# Patient Record
Sex: MALE | Race: WHITE | NOT HISPANIC OR LATINO | Employment: OTHER | URBAN - METROPOLITAN AREA
[De-identification: names, ages, dates, MRNs, and addresses within clinical notes are randomized per-mention and may not be internally consistent; named-entity substitution may affect disease eponyms.]

---

## 2021-05-31 ENCOUNTER — RECORDS - HEALTHEAST (OUTPATIENT)
Dept: ADMINISTRATIVE | Facility: CLINIC | Age: 76
End: 2021-05-31

## 2021-06-03 ENCOUNTER — RECORDS - HEALTHEAST (OUTPATIENT)
Dept: ADMINISTRATIVE | Facility: CLINIC | Age: 76
End: 2021-06-03

## 2021-06-25 ENCOUNTER — COMMUNICATION - HEALTHEAST (OUTPATIENT)
Dept: SCHEDULING | Facility: CLINIC | Age: 76
End: 2021-06-25

## 2021-06-27 ENCOUNTER — COMMUNICATION - HEALTHEAST (OUTPATIENT)
Dept: NEUROSURGERY | Facility: CLINIC | Age: 76
End: 2021-06-27

## 2021-06-29 ENCOUNTER — COMMUNICATION - HEALTHEAST (OUTPATIENT)
Dept: NEUROSURGERY | Facility: CLINIC | Age: 76
End: 2021-06-29

## 2021-07-04 NOTE — TELEPHONE ENCOUNTER
Per ABDIRASHID,  wanted patient scheduled first available appointment with her. Patient declined scheduling at this time as he plans to follow up through [facility]. He stated he may callback if the wait is too long with [facility].

## 2021-07-05 PROBLEM — N17.9 AKI (ACUTE KIDNEY INJURY) (H): Status: ACTIVE | Noted: 2021-06-25

## 2021-07-05 PROBLEM — R55 SYNCOPE, UNSPECIFIED SYNCOPE TYPE: Status: ACTIVE | Noted: 2021-06-24

## 2021-07-12 ENCOUNTER — PREP FOR PROCEDURE (OUTPATIENT)
Dept: NEUROSURGERY | Facility: CLINIC | Age: 76
End: 2021-07-12

## 2021-07-12 DIAGNOSIS — M54.12 CERVICAL RADICULOPATHY: Primary | ICD-10-CM

## 2021-07-13 DIAGNOSIS — Z11.59 ENCOUNTER FOR SCREENING FOR OTHER VIRAL DISEASES: ICD-10-CM

## 2021-07-15 ENCOUNTER — TELEPHONE (OUTPATIENT)
Dept: NEUROSURGERY | Facility: CLINIC | Age: 76
End: 2021-07-15

## 2021-07-15 DIAGNOSIS — Z01.818 PRE-OP TESTING: Primary | ICD-10-CM

## 2021-07-15 NOTE — TELEPHONE ENCOUNTER
ORDER FROM: Dr. Layton    PRE AUTHORIZATION: No PA required. Ok to schedule.    METHOD OF PATIENT CONTACT: Spoke with Shant on the phone. Best number to reach: [phone]    PROCEDURE: Removal of prior cervical 5-cervical 6 cervical plate; cervical 6-cervical 7 anterior cervical decompression and fusion    SURGICAL DATE: 7/26/21 @ 9:10 am - [facility]    COVID TEST: 7/22/21 @ 10:45 am at the [facility] lab    READINESS VISIT: 7/22/21 @ 11:30 am     PCP, CLINIC, PHONE#: Dr. Martin Smith, [facility], [phone]    FILM INFO: MRI CERVICAL: 6/24/21 @ [facility]          XRAY CERVICAL: 6/25/21 @ [facility]    SURGICAL LETTER: E-mailed to patient on 7/15/21

## 2021-07-22 ENCOUNTER — LAB (OUTPATIENT)
Dept: LAB | Facility: HOSPITAL | Age: 76
End: 2021-07-22
Payer: COMMERCIAL

## 2021-07-22 ENCOUNTER — ALLIED HEALTH/NURSE VISIT (OUTPATIENT)
Dept: NEUROSURGERY | Facility: CLINIC | Age: 76
End: 2021-07-22
Payer: COMMERCIAL

## 2021-07-22 ENCOUNTER — LAB (OUTPATIENT)
Dept: FAMILY MEDICINE | Facility: CLINIC | Age: 76
End: 2021-07-22
Attending: SURGERY
Payer: COMMERCIAL

## 2021-07-22 VITALS
SYSTOLIC BLOOD PRESSURE: 164 MMHG | DIASTOLIC BLOOD PRESSURE: 78 MMHG | RESPIRATION RATE: 18 BRPM | HEART RATE: 70 BPM | OXYGEN SATURATION: 95 %

## 2021-07-22 DIAGNOSIS — M54.12 CERVICAL RADICULOPATHY: Primary | ICD-10-CM

## 2021-07-22 DIAGNOSIS — Z01.818 PRE-OP TESTING: ICD-10-CM

## 2021-07-22 DIAGNOSIS — Z11.59 ENCOUNTER FOR SCREENING FOR OTHER VIRAL DISEASES: ICD-10-CM

## 2021-07-22 LAB
ANION GAP SERPL CALCULATED.3IONS-SCNC: 9 MMOL/L (ref 5–18)
APTT PPP: 30 SECONDS (ref 22–38)
BUN SERPL-MCNC: 23 MG/DL (ref 8–28)
CALCIUM SERPL-MCNC: 9.6 MG/DL (ref 8.5–10.5)
CHLORIDE BLD-SCNC: 109 MMOL/L (ref 98–107)
CLOSURE TME COLL+EPINEP BLD: 105 SECONDS
CO2 SERPL-SCNC: 26 MMOL/L (ref 22–31)
CREAT SERPL-MCNC: 1.49 MG/DL (ref 0.7–1.3)
ERYTHROCYTE [DISTWIDTH] IN BLOOD BY AUTOMATED COUNT: 14.7 % (ref 10–15)
GFR SERPL CREATININE-BSD FRML MDRD: 45 ML/MIN/1.73M2
GLUCOSE BLD-MCNC: 84 MG/DL (ref 70–125)
HCT VFR BLD AUTO: 41.8 % (ref 40–53)
HGB BLD-MCNC: 13.3 G/DL (ref 13.3–17.7)
INR PPP: 1.04 (ref 0.9–1.15)
MCH RBC QN AUTO: 26.8 PG (ref 26.5–33)
MCHC RBC AUTO-ENTMCNC: 31.8 G/DL (ref 31.5–36.5)
MCV RBC AUTO: 84 FL (ref 78–100)
PLATELET # BLD AUTO: 394 10E3/UL (ref 150–450)
POTASSIUM BLD-SCNC: 3.8 MMOL/L (ref 3.5–5)
RBC # BLD AUTO: 4.96 10E6/UL (ref 4.4–5.9)
SARS-COV-2 RNA RESP QL NAA+PROBE: NEGATIVE
SODIUM SERPL-SCNC: 144 MMOL/L (ref 136–145)
WBC # BLD AUTO: 7.7 10E3/UL (ref 4–11)

## 2021-07-22 PROCEDURE — U0005 INFEC AGEN DETEC AMPLI PROBE: HCPCS

## 2021-07-22 PROCEDURE — 85610 PROTHROMBIN TIME: CPT | Mod: GZ

## 2021-07-22 PROCEDURE — 82374 ASSAY BLOOD CARBON DIOXIDE: CPT

## 2021-07-22 PROCEDURE — 85730 THROMBOPLASTIN TIME PARTIAL: CPT

## 2021-07-22 PROCEDURE — U0003 INFECTIOUS AGENT DETECTION BY NUCLEIC ACID (DNA OR RNA); SEVERE ACUTE RESPIRATORY SYNDROME CORONAVIRUS 2 (SARS-COV-2) (CORONAVIRUS DISEASE [COVID-19]), AMPLIFIED PROBE TECHNIQUE, MAKING USE OF HIGH THROUGHPUT TECHNOLOGIES AS DESCRIBED BY CMS-2020-01-R: HCPCS

## 2021-07-22 PROCEDURE — 85027 COMPLETE CBC AUTOMATED: CPT

## 2021-07-22 PROCEDURE — 36415 COLL VENOUS BLD VENIPUNCTURE: CPT

## 2021-07-22 PROCEDURE — 99207 PR NO CHARGE NURSE ONLY: CPT

## 2021-07-22 PROCEDURE — 85576 BLOOD PLATELET AGGREGATION: CPT | Mod: TC

## 2021-07-22 NOTE — PROGRESS NOTES
Preop Assessment: Shant Muñoz presents for pre-op review.  Surgeon: Calin  Name of Surgery: Removal of C5-6 hardware; C6-7 ACDF   Diagnosis: cervical radiculopathy   Date of Surgery: 7/26/21  Time of Surgery: 0910  Hospital: [facility]   H&P: Completed 7/8/21 at [facility] - cleared for surgery   History of ASA, NSAIDS, vitamin and/or herbal supplements within 10 days: No  History of blood thinners: Yes  History of anti-seizure med's: No  Review of systems: WNL    Diagnostics:  Labs: WNL, covid pending   CXR: n/a   EKG: WNL  Other: n/a   Films: MRI cervical 6/24/21    Nausea or Vomiting - denies  Urinary retention - denies  Pain management - no Red Flags  Home PT Evaluation: ambulates independently, steady gait and stride, no imbalance noted  - no indication for Home PT pre-op    Patient does NOT have help/assistance in place at home upon discharge; will likely need TCU placement and/or home care services.     All questions answered regarding surgery and expected pre and postoperative course including rehabilitation phase.     Reviewed with patient: Arrive 2.5 -3 hours prior to scheduled surgery, nothing to eat or drink after midnight the night before surgery and bring all pertinent films to the hospital the day of surgery.  Continue to refrain from NSAIDS (Ibuprofen, Aleve, Naprosyn) ASA or over the counter herbal medication or supplements, anticoagulants and blood thinners.    Preop skin preparations and instructions provided.    Patient was informed that we will provide up to 1 week prescription of pain medication for post-operative pain.    Discussed with patient the following: after your surgery, if you will be staying in-patient, a nursing team will be monitoring you closely throughout your stay and communicate your health status to your surgeon and other providers.  You will be seen by Advanced Practice Providers (e.g., nurse practitioners, clinic nurse specialist, and physician assistants) who will check  on you regularly to assess the status of your surgery.   All of pt's questions and concerns were addressed to his satisfaction. He was informed that we will call him after discharge to schedule his post-op follow up appointments.     Holly Barnes RN

## 2021-07-26 ENCOUNTER — APPOINTMENT (OUTPATIENT)
Dept: RADIOLOGY | Facility: HOSPITAL | Age: 76
End: 2021-07-26
Attending: SURGERY
Payer: COMMERCIAL

## 2021-07-26 ENCOUNTER — HOSPITAL ENCOUNTER (OUTPATIENT)
Facility: HOSPITAL | Age: 76
LOS: 1 days | Discharge: HOME | End: 2021-07-27
Attending: SURGERY | Admitting: SURGERY
Payer: COMMERCIAL

## 2021-07-26 ENCOUNTER — ANESTHESIA (OUTPATIENT)
Dept: SURGERY | Facility: HOSPITAL | Age: 76
End: 2021-07-26
Payer: COMMERCIAL

## 2021-07-26 ENCOUNTER — ANESTHESIA EVENT (OUTPATIENT)
Dept: SURGERY | Facility: HOSPITAL | Age: 76
End: 2021-07-26
Payer: COMMERCIAL

## 2021-07-26 DIAGNOSIS — M54.12 CERVICAL RADICULOPATHY: ICD-10-CM

## 2021-07-26 DIAGNOSIS — M54.12 BRACHIAL NEURITIS OR RADICULITIS: Primary | ICD-10-CM

## 2021-07-26 LAB
GLUCOSE BLDC GLUCOMTR-MCNC: 123 MG/DL (ref 70–125)
GLUCOSE BLDC GLUCOMTR-MCNC: 150 MG/DL (ref 70–125)

## 2021-07-26 PROCEDURE — 22853 INSJ BIOMECHANICAL DEVICE: CPT | Mod: AS | Performed by: NURSE PRACTITIONER

## 2021-07-26 PROCEDURE — 22551 ARTHRD ANT NTRBDY CERVICAL: CPT | Performed by: SURGERY

## 2021-07-26 PROCEDURE — 710N000009 HC RECOVERY PHASE 1, LEVEL 1, PER MIN: Performed by: SURGERY

## 2021-07-26 PROCEDURE — 22855 REMOVAL ANTERIOR INSTRMJ: CPT | Mod: AS | Performed by: NURSE PRACTITIONER

## 2021-07-26 PROCEDURE — 278N000051 HC OR IMPLANT GENERAL: Performed by: SURGERY

## 2021-07-26 PROCEDURE — 999N000141 HC STATISTIC PRE-PROCEDURE NURSING ASSESSMENT: Performed by: SURGERY

## 2021-07-26 PROCEDURE — 250N000009 HC RX 250

## 2021-07-26 PROCEDURE — 258N000003 HC RX IP 258 OP 636: Performed by: NURSE PRACTITIONER

## 2021-07-26 PROCEDURE — C1762 CONN TISS, HUMAN(INC FASCIA): HCPCS | Performed by: SURGERY

## 2021-07-26 PROCEDURE — 250N000013 HC RX MED GY IP 250 OP 250 PS 637: Performed by: NURSE PRACTITIONER

## 2021-07-26 PROCEDURE — 250N000009 HC RX 250: Performed by: NURSE ANESTHETIST, CERTIFIED REGISTERED

## 2021-07-26 PROCEDURE — 360N000077 HC SURGERY LEVEL 4, PER MIN: Performed by: SURGERY

## 2021-07-26 PROCEDURE — C1713 ANCHOR/SCREW BN/BN,TIS/BN: HCPCS | Performed by: SURGERY

## 2021-07-26 PROCEDURE — 22855 REMOVAL ANTERIOR INSTRMJ: CPT | Performed by: SURGERY

## 2021-07-26 PROCEDURE — 258N000003 HC RX IP 258 OP 636: Performed by: NURSE ANESTHETIST, CERTIFIED REGISTERED

## 2021-07-26 PROCEDURE — 22845 INSERT SPINE FIXATION DEVICE: CPT | Mod: AS | Performed by: NURSE PRACTITIONER

## 2021-07-26 PROCEDURE — 22551 ARTHRD ANT NTRBDY CERVICAL: CPT | Mod: AS | Performed by: NURSE PRACTITIONER

## 2021-07-26 PROCEDURE — 250N000011 HC RX IP 250 OP 636: Performed by: ANESTHESIOLOGY

## 2021-07-26 PROCEDURE — 370N000017 HC ANESTHESIA TECHNICAL FEE, PER MIN: Performed by: SURGERY

## 2021-07-26 PROCEDURE — 272N000683 HC OR SPINE - CAGE/SPACER/DISK/CORD/CONNECTOR OPNP: Performed by: SURGERY

## 2021-07-26 PROCEDURE — 120N000001 HC R&B MED SURG/OB

## 2021-07-26 PROCEDURE — 272N000001 HC OR GENERAL SUPPLY STERILE: Performed by: SURGERY

## 2021-07-26 PROCEDURE — 22845 INSERT SPINE FIXATION DEVICE: CPT | Mod: 59 | Performed by: SURGERY

## 2021-07-26 PROCEDURE — 250N000009 HC RX 250: Performed by: SURGERY

## 2021-07-26 PROCEDURE — 250N000011 HC RX IP 250 OP 636

## 2021-07-26 PROCEDURE — 999N000182 XR SURGERY CARM FLUORO GREATER THAN 5 MIN

## 2021-07-26 PROCEDURE — 250N000025 HC SEVOFLURANE, PER MIN: Performed by: SURGERY

## 2021-07-26 PROCEDURE — 72040 X-RAY EXAM NECK SPINE 2-3 VW: CPT

## 2021-07-26 PROCEDURE — 250N000011 HC RX IP 250 OP 636: Performed by: NURSE ANESTHETIST, CERTIFIED REGISTERED

## 2021-07-26 PROCEDURE — 22853 INSJ BIOMECHANICAL DEVICE: CPT | Performed by: SURGERY

## 2021-07-26 DEVICE — IMP PLATE CERV MEDT ZEVO 23MM 1 LVL 3001023: Type: IMPLANTABLE DEVICE | Site: SPINE CERVICAL | Status: FUNCTIONAL

## 2021-07-26 DEVICE — GRAFT BONE FIBERS GRAFTON DBM DBF 1ML T50101: Type: IMPLANTABLE DEVICE | Site: SPINE CERVICAL | Status: FUNCTIONAL

## 2021-07-26 DEVICE — IMP SCR MEDT ZEVO 4.0X15MM SD VA 7714015: Type: IMPLANTABLE DEVICE | Site: SPINE CERVICAL | Status: FUNCTIONAL

## 2021-07-26 DEVICE — INTERBODY FUSION DEVICE  6 DEGREE MEDIUM 7MM
Type: IMPLANTABLE DEVICE | Site: SPINE CERVICAL | Status: FUNCTIONAL
Brand: ENDOSKELETON® TC NANOLOCK® SURFACE TECHNOLOGY

## 2021-07-26 DEVICE — IMP SCR MEDT ZEVO 3.5X15MM SD VA 7713515: Type: IMPLANTABLE DEVICE | Site: SPINE CERVICAL | Status: FUNCTIONAL

## 2021-07-26 RX ORDER — FENTANYL CITRATE 50 UG/ML
INJECTION, SOLUTION INTRAMUSCULAR; INTRAVENOUS PRN
Status: DISCONTINUED | OUTPATIENT
Start: 2021-07-26 | End: 2021-07-26

## 2021-07-26 RX ORDER — CEFAZOLIN SODIUM 2 G/100ML
INJECTION, SOLUTION INTRAVENOUS PRN
Status: DISCONTINUED | OUTPATIENT
Start: 2021-07-26 | End: 2021-07-26

## 2021-07-26 RX ORDER — LORATADINE 10 MG/1
10 TABLET ORAL DAILY PRN
Status: DISCONTINUED | OUTPATIENT
Start: 2021-07-26 | End: 2021-07-27 | Stop reason: HOSPADM

## 2021-07-26 RX ORDER — LANOLIN ALCOHOL/MO/W.PET/CERES
1 CREAM (GRAM) TOPICAL
COMMUNITY
End: 2021-11-09

## 2021-07-26 RX ORDER — ACETAMINOPHEN 325 MG/1
650 TABLET ORAL EVERY 4 HOURS PRN
Status: DISCONTINUED | OUTPATIENT
Start: 2021-07-29 | End: 2021-07-27 | Stop reason: HOSPADM

## 2021-07-26 RX ORDER — NALOXONE HYDROCHLORIDE 0.4 MG/ML
0.2 INJECTION, SOLUTION INTRAMUSCULAR; INTRAVENOUS; SUBCUTANEOUS
Status: DISCONTINUED | OUTPATIENT
Start: 2021-07-26 | End: 2021-07-27 | Stop reason: HOSPADM

## 2021-07-26 RX ORDER — PROPOFOL 10 MG/ML
INJECTION, EMULSION INTRAVENOUS CONTINUOUS PRN
Status: DISCONTINUED | OUTPATIENT
Start: 2021-07-26 | End: 2021-07-26

## 2021-07-26 RX ORDER — SODIUM CHLORIDE, SODIUM LACTATE, POTASSIUM CHLORIDE, CALCIUM CHLORIDE 600; 310; 30; 20 MG/100ML; MG/100ML; MG/100ML; MG/100ML
INJECTION, SOLUTION INTRAVENOUS CONTINUOUS
Status: DISCONTINUED | OUTPATIENT
Start: 2021-07-26 | End: 2021-07-26 | Stop reason: HOSPADM

## 2021-07-26 RX ORDER — DIPHENHYDRAMINE HCL 12.5 MG/5ML
12.5 SOLUTION ORAL EVERY 6 HOURS PRN
Status: DISCONTINUED | OUTPATIENT
Start: 2021-07-26 | End: 2021-07-26 | Stop reason: HOSPADM

## 2021-07-26 RX ORDER — FENTANYL CITRATE 50 UG/ML
50 INJECTION, SOLUTION INTRAMUSCULAR; INTRAVENOUS EVERY 5 MIN PRN
Status: DISCONTINUED | OUTPATIENT
Start: 2021-07-26 | End: 2021-07-26 | Stop reason: HOSPADM

## 2021-07-26 RX ORDER — HYDROMORPHONE HCL IN WATER/PF 6 MG/30 ML
0.4 PATIENT CONTROLLED ANALGESIA SYRINGE INTRAVENOUS EVERY 5 MIN PRN
Status: DISCONTINUED | OUTPATIENT
Start: 2021-07-26 | End: 2021-07-26 | Stop reason: HOSPADM

## 2021-07-26 RX ORDER — ONDANSETRON 2 MG/ML
4 INJECTION INTRAMUSCULAR; INTRAVENOUS EVERY 6 HOURS PRN
Status: DISCONTINUED | OUTPATIENT
Start: 2021-07-26 | End: 2021-07-27 | Stop reason: HOSPADM

## 2021-07-26 RX ORDER — ALBUTEROL SULFATE 0.83 MG/ML
2.5 SOLUTION RESPIRATORY (INHALATION) EVERY 4 HOURS PRN
Status: DISCONTINUED | OUTPATIENT
Start: 2021-07-26 | End: 2021-07-26 | Stop reason: HOSPADM

## 2021-07-26 RX ORDER — LIDOCAINE HYDROCHLORIDE 20 MG/ML
INJECTION, SOLUTION INFILTRATION; PERINEURAL PRN
Status: DISCONTINUED | OUTPATIENT
Start: 2021-07-26 | End: 2021-07-26

## 2021-07-26 RX ORDER — BISACODYL 10 MG
10 SUPPOSITORY, RECTAL RECTAL DAILY PRN
Status: DISCONTINUED | OUTPATIENT
Start: 2021-07-26 | End: 2021-07-27 | Stop reason: HOSPADM

## 2021-07-26 RX ORDER — HYDROMORPHONE HCL IN WATER/PF 6 MG/30 ML
0.4 PATIENT CONTROLLED ANALGESIA SYRINGE INTRAVENOUS
Status: DISCONTINUED | OUTPATIENT
Start: 2021-07-26 | End: 2021-07-27 | Stop reason: HOSPADM

## 2021-07-26 RX ORDER — ONDANSETRON 4 MG/1
4 TABLET, ORALLY DISINTEGRATING ORAL EVERY 30 MIN PRN
Status: DISCONTINUED | OUTPATIENT
Start: 2021-07-26 | End: 2021-07-26 | Stop reason: HOSPADM

## 2021-07-26 RX ORDER — SODIUM CHLORIDE 9 MG/ML
INJECTION, SOLUTION INTRAVENOUS CONTINUOUS
Status: DISCONTINUED | OUTPATIENT
Start: 2021-07-26 | End: 2021-07-27

## 2021-07-26 RX ORDER — DEXAMETHASONE SODIUM PHOSPHATE 10 MG/ML
INJECTION, SOLUTION INTRAMUSCULAR; INTRAVENOUS PRN
Status: DISCONTINUED | OUTPATIENT
Start: 2021-07-26 | End: 2021-07-26

## 2021-07-26 RX ORDER — NALOXONE HYDROCHLORIDE 0.4 MG/ML
0.4 INJECTION, SOLUTION INTRAMUSCULAR; INTRAVENOUS; SUBCUTANEOUS
Status: DISCONTINUED | OUTPATIENT
Start: 2021-07-26 | End: 2021-07-27 | Stop reason: HOSPADM

## 2021-07-26 RX ORDER — LABETALOL 20 MG/4 ML (5 MG/ML) INTRAVENOUS SYRINGE
PRN
Status: DISCONTINUED | OUTPATIENT
Start: 2021-07-26 | End: 2021-07-26

## 2021-07-26 RX ORDER — ONDANSETRON 2 MG/ML
INJECTION INTRAMUSCULAR; INTRAVENOUS PRN
Status: DISCONTINUED | OUTPATIENT
Start: 2021-07-26 | End: 2021-07-26

## 2021-07-26 RX ORDER — MEPERIDINE HYDROCHLORIDE 25 MG/ML
12.5 INJECTION INTRAMUSCULAR; INTRAVENOUS; SUBCUTANEOUS EVERY 5 MIN PRN
Status: DISCONTINUED | OUTPATIENT
Start: 2021-07-26 | End: 2021-07-26 | Stop reason: HOSPADM

## 2021-07-26 RX ORDER — ONDANSETRON 2 MG/ML
4 INJECTION INTRAMUSCULAR; INTRAVENOUS EVERY 30 MIN PRN
Status: DISCONTINUED | OUTPATIENT
Start: 2021-07-26 | End: 2021-07-26 | Stop reason: HOSPADM

## 2021-07-26 RX ORDER — LORAZEPAM 2 MG/ML
0.5 INJECTION INTRAMUSCULAR ONCE
Status: COMPLETED | OUTPATIENT
Start: 2021-07-26 | End: 2021-07-27

## 2021-07-26 RX ORDER — AMOXICILLIN 250 MG
1 CAPSULE ORAL 2 TIMES DAILY
Status: DISCONTINUED | OUTPATIENT
Start: 2021-07-26 | End: 2021-07-27 | Stop reason: HOSPADM

## 2021-07-26 RX ORDER — METHOCARBAMOL 500 MG/1
500 TABLET, FILM COATED ORAL EVERY 6 HOURS PRN
Status: DISCONTINUED | OUTPATIENT
Start: 2021-07-26 | End: 2021-07-27

## 2021-07-26 RX ORDER — FAMOTIDINE 20 MG/1
20 TABLET, FILM COATED ORAL 2 TIMES DAILY
Status: DISCONTINUED | OUTPATIENT
Start: 2021-07-26 | End: 2021-07-27 | Stop reason: HOSPADM

## 2021-07-26 RX ORDER — OXYCODONE HYDROCHLORIDE 5 MG/1
5 TABLET ORAL EVERY 4 HOURS PRN
Status: DISCONTINUED | OUTPATIENT
Start: 2021-07-26 | End: 2021-07-27 | Stop reason: HOSPADM

## 2021-07-26 RX ORDER — DIPHENHYDRAMINE HYDROCHLORIDE 50 MG/ML
12.5 INJECTION INTRAMUSCULAR; INTRAVENOUS EVERY 6 HOURS PRN
Status: DISCONTINUED | OUTPATIENT
Start: 2021-07-26 | End: 2021-07-26 | Stop reason: HOSPADM

## 2021-07-26 RX ORDER — PROCHLORPERAZINE MALEATE 5 MG
5 TABLET ORAL EVERY 6 HOURS PRN
Status: DISCONTINUED | OUTPATIENT
Start: 2021-07-26 | End: 2021-07-27 | Stop reason: HOSPADM

## 2021-07-26 RX ORDER — LABETALOL HYDROCHLORIDE 5 MG/ML
10 INJECTION, SOLUTION INTRAVENOUS
Status: DISCONTINUED | OUTPATIENT
Start: 2021-07-26 | End: 2021-07-26 | Stop reason: HOSPADM

## 2021-07-26 RX ORDER — LIDOCAINE 40 MG/G
CREAM TOPICAL
Status: DISCONTINUED | OUTPATIENT
Start: 2021-07-26 | End: 2021-07-26 | Stop reason: HOSPADM

## 2021-07-26 RX ORDER — ONDANSETRON 4 MG/1
4 TABLET, ORALLY DISINTEGRATING ORAL EVERY 6 HOURS PRN
Status: DISCONTINUED | OUTPATIENT
Start: 2021-07-26 | End: 2021-07-27 | Stop reason: HOSPADM

## 2021-07-26 RX ORDER — AMLODIPINE BESYLATE 5 MG/1
5 TABLET ORAL DAILY
Status: DISCONTINUED | OUTPATIENT
Start: 2021-07-26 | End: 2021-07-27 | Stop reason: HOSPADM

## 2021-07-26 RX ORDER — HALOPERIDOL 5 MG/ML
1 INJECTION INTRAMUSCULAR
Status: DISCONTINUED | OUTPATIENT
Start: 2021-07-26 | End: 2021-07-26 | Stop reason: HOSPADM

## 2021-07-26 RX ORDER — HYDROMORPHONE HCL IN WATER/PF 6 MG/30 ML
0.2 PATIENT CONTROLLED ANALGESIA SYRINGE INTRAVENOUS
Status: DISCONTINUED | OUTPATIENT
Start: 2021-07-26 | End: 2021-07-27 | Stop reason: HOSPADM

## 2021-07-26 RX ORDER — OXYCODONE HYDROCHLORIDE 5 MG/1
10 TABLET ORAL EVERY 4 HOURS PRN
Status: DISCONTINUED | OUTPATIENT
Start: 2021-07-26 | End: 2021-07-27 | Stop reason: HOSPADM

## 2021-07-26 RX ORDER — LISINOPRIL 20 MG/1
20 TABLET ORAL DAILY
Status: DISCONTINUED | OUTPATIENT
Start: 2021-07-27 | End: 2021-07-27 | Stop reason: HOSPADM

## 2021-07-26 RX ORDER — PROPOFOL 10 MG/ML
INJECTION, EMULSION INTRAVENOUS PRN
Status: DISCONTINUED | OUTPATIENT
Start: 2021-07-26 | End: 2021-07-26

## 2021-07-26 RX ORDER — POLYETHYLENE GLYCOL 3350 17 G/17G
17 POWDER, FOR SOLUTION ORAL DAILY
Status: DISCONTINUED | OUTPATIENT
Start: 2021-07-27 | End: 2021-07-27 | Stop reason: HOSPADM

## 2021-07-26 RX ORDER — ACETAMINOPHEN 325 MG/1
975 TABLET ORAL EVERY 8 HOURS
Status: DISCONTINUED | OUTPATIENT
Start: 2021-07-26 | End: 2021-07-27 | Stop reason: HOSPADM

## 2021-07-26 RX ORDER — SODIUM CHLORIDE, SODIUM LACTATE, POTASSIUM CHLORIDE, CALCIUM CHLORIDE 600; 310; 30; 20 MG/100ML; MG/100ML; MG/100ML; MG/100ML
INJECTION, SOLUTION INTRAVENOUS CONTINUOUS PRN
Status: DISCONTINUED | OUTPATIENT
Start: 2021-07-26 | End: 2021-07-26

## 2021-07-26 RX ORDER — MULTIVITAMIN,THERAPEUTIC
1 TABLET ORAL DAILY
Status: DISCONTINUED | OUTPATIENT
Start: 2021-07-27 | End: 2021-07-27 | Stop reason: HOSPADM

## 2021-07-26 RX ORDER — PRAVASTATIN SODIUM 20 MG
80 TABLET ORAL AT BEDTIME
Status: DISCONTINUED | OUTPATIENT
Start: 2021-07-26 | End: 2021-07-27 | Stop reason: HOSPADM

## 2021-07-26 RX ADMIN — DEXMEDETOMIDINE 0.4 MCG/KG/HR: 100 INJECTION, SOLUTION, CONCENTRATE INTRAVENOUS at 09:32

## 2021-07-26 RX ADMIN — DOCUSATE SODIUM 50 MG AND SENNOSIDES 8.6 MG 1 TABLET: 8.6; 5 TABLET, FILM COATED ORAL at 20:38

## 2021-07-26 RX ADMIN — FENTANYL CITRATE 50 MCG: 50 INJECTION, SOLUTION INTRAMUSCULAR; INTRAVENOUS at 09:28

## 2021-07-26 RX ADMIN — FENTANYL CITRATE 50 MCG: 50 INJECTION, SOLUTION INTRAMUSCULAR; INTRAVENOUS at 14:27

## 2021-07-26 RX ADMIN — PROPOFOL 100 MG: 10 INJECTION, EMULSION INTRAVENOUS at 09:22

## 2021-07-26 RX ADMIN — METHOCARBAMOL 500 MG: 500 TABLET, FILM COATED ORAL at 16:51

## 2021-07-26 RX ADMIN — SODIUM CHLORIDE: 9 INJECTION, SOLUTION INTRAVENOUS at 16:51

## 2021-07-26 RX ADMIN — ACETAMINOPHEN 975 MG: 325 TABLET ORAL at 20:38

## 2021-07-26 RX ADMIN — PROPOFOL 150 MCG/KG/MIN: 10 INJECTION, EMULSION INTRAVENOUS at 09:15

## 2021-07-26 RX ADMIN — PROPOFOL 100 MG: 10 INJECTION, EMULSION INTRAVENOUS at 09:14

## 2021-07-26 RX ADMIN — LABETALOL 20 MG/4 ML (5 MG/ML) INTRAVENOUS SYRINGE 15 MG: at 12:31

## 2021-07-26 RX ADMIN — FAMOTIDINE 20 MG: 20 TABLET, FILM COATED ORAL at 20:37

## 2021-07-26 RX ADMIN — SODIUM CHLORIDE, POTASSIUM CHLORIDE, SODIUM LACTATE AND CALCIUM CHLORIDE: 600; 310; 30; 20 INJECTION, SOLUTION INTRAVENOUS at 08:56

## 2021-07-26 RX ADMIN — FENTANYL CITRATE 150 MCG: 50 INJECTION, SOLUTION INTRAMUSCULAR; INTRAVENOUS at 09:14

## 2021-07-26 RX ADMIN — CEFAZOLIN SODIUM 2 G: 2 INJECTION, SOLUTION INTRAVENOUS at 09:22

## 2021-07-26 RX ADMIN — SODIUM CHLORIDE, POTASSIUM CHLORIDE, SODIUM LACTATE AND CALCIUM CHLORIDE: 600; 310; 30; 20 INJECTION, SOLUTION INTRAVENOUS at 10:16

## 2021-07-26 RX ADMIN — PRAVASTATIN SODIUM 80 MG: 20 TABLET ORAL at 20:37

## 2021-07-26 RX ADMIN — LIDOCAINE HYDROCHLORIDE 40 MG: 20 INJECTION, SOLUTION INFILTRATION; PERINEURAL at 09:14

## 2021-07-26 RX ADMIN — SUCCINYLCHOLINE CHLORIDE 100 MG: 20 INJECTION, SOLUTION INTRAMUSCULAR; INTRAVENOUS at 09:14

## 2021-07-26 RX ADMIN — DEXAMETHASONE SODIUM PHOSPHATE 10 MG: 10 INJECTION, SOLUTION INTRAMUSCULAR; INTRAVENOUS at 09:29

## 2021-07-26 RX ADMIN — HYDROMORPHONE HYDROCHLORIDE 0.5 MG: 1 INJECTION, SOLUTION INTRAMUSCULAR; INTRAVENOUS; SUBCUTANEOUS at 12:00

## 2021-07-26 RX ADMIN — ONDANSETRON 4 MG: 2 INJECTION INTRAMUSCULAR; INTRAVENOUS at 12:08

## 2021-07-26 NOTE — ANESTHESIA PREPROCEDURE EVALUATION
Anesthesia Pre-Procedure Evaluation    Patient: Shant Muñoz   MRN: [id number] : 1945        Preoperative Diagnosis: Cervical radiculopathy [M54.12]   Procedure : Procedure(s):  REMOVAL OF PRIOR CERVICAL 5-CERVICAL 6 CERVICAL PLATE  CERVICAL 6-CERVICAL 7 ANTERIOR CERVICAL DECOMPRESSION AND FUSION     Past Medical History:   Diagnosis Date     Diabetes (H)      Hypertension       Past Surgical History:   Procedure Laterality Date     ANTERIOR FUSION CERVICAL SPINE  2021    C5-6 plate removal and C6-7 decompression and fusion     IR CERVICAL EPIDURAL STEROID INJECTION  2012     IR CERVICAL EPIDURAL STEROID INJECTION  2021      No Known Allergies   Social History     Tobacco Use     Smoking status: Former Smoker     Smokeless tobacco: Never Used   Substance Use Topics     Alcohol use: Yes      Wt Readings from Last 1 Encounters:   21 85.9 kg (189 lb 6.4 oz)        Anesthesia Evaluation   Pt has had prior anesthetic. Type: General and MAC.    No history of anesthetic complications       ROS/MED HX  ENT/Pulmonary:  - neg pulmonary ROS     Neurologic:  - neg neurologic ROS     Cardiovascular:  - neg cardiovascular ROS   (+) Dyslipidemia hypertension-----valvular problems/murmurs type: AS     METS/Exercise Tolerance:     Hematologic:  - neg hematologic  ROS     Musculoskeletal:  - neg musculoskeletal ROS     GI/Hepatic:  - neg GI/hepatic ROS     Renal/Genitourinary:  - neg Renal ROS     Endo:     (+) type II DM, not previously admitted for DM/DKA.     Psychiatric/Substance Use:     (+) psychiatric history depression     Infectious Disease:  - neg infectious disease ROS     Malignancy:  - neg malignancy ROS     Other:            Physical Exam    Airway   unable to assess     Mallampati: I   TM distance: > 3 FB   Neck ROM: full     Respiratory Devices and Support         Dental  no notable dental history         Cardiovascular   cardiovascular exam normal       Rhythm and rate: regular and  normal     Pulmonary   pulmonary exam normal        breath sounds clear to auscultation           OUTSIDE LABS:  CBC:   Lab Results   Component Value Date    WBC 7.7 07/22/2021    WBC 8.0 06/26/2021    HGB 13.3 07/22/2021    HGB 12.1 (L) 06/26/2021    HCT 41.8 07/22/2021    HCT 37.3 (L) 06/26/2021     07/22/2021     06/26/2021     BMP:   Lab Results   Component Value Date     07/22/2021     06/27/2021    POTASSIUM 3.8 07/22/2021    POTASSIUM 3.4 (L) 06/27/2021    CHLORIDE 109 (H) 07/22/2021    CHLORIDE 113 (H) 06/27/2021    CO2 26 07/22/2021    CO2 21 (L) 06/27/2021    BUN 23 07/22/2021    BUN 16 06/27/2021    CR 1.49 (H) 07/22/2021    CR 0.90 06/27/2021     07/26/2021    GLC 84 07/22/2021     COAGS:   Lab Results   Component Value Date    PTT 30 07/22/2021    INR 1.04 07/22/2021     POC: No results found for: BGM, HCG, HCGS  HEPATIC:   Lab Results   Component Value Date    ALBUMIN 3.5 06/24/2021    PROTTOTAL 7.0 06/24/2021    ALT 32 06/24/2021    AST 24 06/24/2021    ALKPHOS 95 06/24/2021    BILITOTAL 0.6 06/24/2021     OTHER:   Lab Results   Component Value Date    A1C 6.0 08/26/2011    REJI 9.6 07/22/2021    MAG 2.5 06/24/2021       Anesthesia Plan    ASA Status:  3      Anesthesia Type: General.     - Airway: ETT   Induction: Intravenous, Propofol.   Maintenance: TIVA.   Techniques and Equipment:     - Airway: Video-Laryngoscope     - Lines/Monitors: 2nd IV, Arterial Line     Consents    Anesthesia Plan(s) and associated risks, benefits, and realistic alternatives discussed. Questions answered and patient/representative(s) expressed understanding.     - Discussed with:  Patient, Other (See Comment)      - Extended Intubation/Ventilatory Support Discussed: No.      - Patient is DNR/DNI Status: No    Use of blood products discussed: No .     Postoperative Care    Pain management: IV analgesics.   PONV prophylaxis: Ondansetron (or other 5HT-3), Dexamethasone or Solumedrol      Comments:                Wyatt Coleman MD

## 2021-07-26 NOTE — ANESTHESIA CARE TRANSFER NOTE
Patient: Shant Muñoz    Procedure(s):  REMOVAL OF PRIOR CERVICAL 5-CERVICAL 6 CERVICAL PLATE  CERVICAL 6-CERVICAL 7 ANTERIOR CERVICAL DECOMPRESSION AND FUSION    Diagnosis: Cervical radiculopathy [M54.12]  Diagnosis Additional Information: No value filed.    Anesthesia Type:   General     Note:    Oropharynx: oropharynx clear of all foreign objects and spontaneously breathing  Level of Consciousness: drowsy  Oxygen Supplementation: face mask  Level of Supplemental Oxygen (L/min / FiO2): 8      Vital Signs Stable: post-procedure vital signs reviewed and stable  Report to RN Given: handoff report given  Patient transferred to: PACU    Handoff Report: Identifed the Patient, Identified the Reponsible Provider, Reviewed the pertinent medical history, Discussed the surgical course, Reviewed Intra-OP anesthesia mangement and issues during anesthesia, Set expectations for post-procedure period and Allowed opportunity for questions and acknowledgement of understanding      Vitals:  Vitals Value Taken Time   BP     Temp     Pulse     Resp     SpO2         Electronically Signed By: RAUL Sanchez CRNA  July 26, 2021  12:53 PM

## 2021-07-26 NOTE — PHARMACY-ADMISSION MEDICATION HISTORY
Pharmacy Note - Admission Medication History    Pertinent Provider Information: Patient is no longer taking famotidine and gabapentin.   ______________________________________________________________________    Prior To Admission (PTA) med list completed and updated in EMR.       Prior to Admission Medications   Prescriptions Last Dose Informant Patient Reported? Taking?   FLUoxetine (PROZAC) 40 MG capsule 7/26/2021 at 0500  Yes Yes   Sig: [FLUOXETINE (PROZAC) 40 MG CAPSULE] Take 40 mg by mouth daily.   amLODIPine (NORVASC) 5 MG tablet 7/26/2021 at 0500  No Yes   Sig: [AMLODIPINE (NORVASC) 5 MG TABLET] Take 1 tablet (5 mg total) by mouth daily.   aspirin 325 MG tablet 7/14/2021  Yes Yes   Sig: [ASPIRIN 325 MG TABLET] Take 325 mg by mouth daily.   famotidine (PEPCID) 20 MG tablet Not Taking at Unknown time  No No   Sig: [FAMOTIDINE (PEPCID) 20 MG TABLET] Take 1 tablet (20 mg total) by mouth 2 (two) times a day.   Patient not taking: Reported on 7/26/2021   gabapentin (NEURONTIN) 300 MG capsule Not Taking at Unknown time  No No   Sig: [GABAPENTIN (NEURONTIN) 300 MG CAPSULE] Take 2 capsules (600 mg total) by mouth 3 (three) times a day.   Patient not taking: Reported on 7/26/2021   lisinopriL (PRINIVIL,ZESTRIL) 20 MG tablet 7/25/2021 at Unknown time  No Yes   Sig: [LISINOPRIL (PRINIVIL,ZESTRIL) 20 MG TABLET] Take 1 tablet (20 mg total) by mouth daily.   melatonin 3 MG tablet Past Month at Unknown time  Yes Yes   Sig: Take 1 mg by mouth nightly as needed for sleep   pravastatin (PRAVACHOL) 80 MG tablet Past Week at Unknown time  Yes Yes   Sig: Take 80 mg by mouth At Bedtime    tiZANidine (ZANAFLEX) 2 MG tablet Past Week at Unknown time  Yes Yes   Sig: [TIZANIDINE (ZANAFLEX) 2 MG TABLET] Take 2-4 mg by mouth every 6 (six) hours as needed for muscle spasms.   traMADoL (ULTRAM) 50 mg tablet Past Week at Unknown time  Yes Yes   Sig: Take 50 mg by mouth every 6 hours as needed for pain For severe neck pain not relieved by  Tylenol or Aleve      Facility-Administered Medications: None       Information source(s): Patient, Hospital records and [facility]    Method of interview communication: in-person    Patient was asked about OTC/herbal products specifically.  PTA med list reflects this.    Based on the pharmacist's assessment, the PTA med list information appears reliable    Allergies were reviewed, assessed, and updated with the patient.      Patient does not use any multi-dose medications prior to admission.     Thank you for the opportunity to participate in the care of this patient.      Angie Castillo [facility]     7/26/2021     7:52 AM

## 2021-07-26 NOTE — BRIEF OP NOTE
[facility] Brief Operative Note    Pre-operative diagnosis: Cervical radiculopathy [M54.12]   Post-operative diagnosis same   Procedure: Procedure(s):  REMOVAL OF PRIOR CERVICAL 5-CERVICAL 6 CERVICAL PLATE  CERVICAL 6-CERVICAL 7 ANTERIOR CERVICAL DECOMPRESSION AND FUSION   Surgeon(s): Surgeon(s) and Role:     * Nataly Layton MD - Primary     * Chelsea Stock APRN CNP - Assisting   Estimated blood loss: <40 cc    Specimens: * No specimens in log *   Findings: C5-6 plate removed. DDD C6-7    Implants:                             Implant Name Type Inv. Item Serial No.  Lot No. LRB No. Used Action   [place]   [id number]   Bilateral 1 Implanted   ENDOSKELETON TC IMPLANT 6 DEG MED 16 MM X 14 MM- 7 MM    MED[employer]ONIC [id number] N/A 1 Implanted   IMP PLATE CERV MEDT ZEVO 23MM 1 LVL [id number] - [id number] Metallic Hardware/[place] IMP PLATE CERV MEDT ZEVO 23MM 1 LVL [id number]  [employer]  N/A 3 Implanted   IMP SCR MEDT ZEVO 4.0X15MM SD VA [id number] - [id number] Metallic Hardware/[place] IMP SCR MEDT ZEVO 4.0X15MM SD VA [id number]  [employer]  N/A 1 Implanted   IMP SCR MEDT ZEVO 3.5X15MM SD VA [id number] - [id number] Metallic Hardware/[place] IMP SCR MEDT ZEVO 3.5X15MM SD VA [id number]  [employer]  N/A 1 Implanted

## 2021-07-26 NOTE — ANESTHESIA POSTPROCEDURE EVALUATION
Patient: Shant Muñoz    Procedure(s):  REMOVAL OF PRIOR CERVICAL 5-CERVICAL 6 CERVICAL PLATE  CERVICAL 6-CERVICAL 7 ANTERIOR CERVICAL DECOMPRESSION AND FUSION    Diagnosis:Cervical radiculopathy [M54.12]  Diagnosis Additional Information: No value filed.    Anesthesia Type:  General    Note:  Disposition: Inpatient   Postop Pain Control: Uneventful            Sign Out: Well controlled pain   PONV: No   Neuro/Psych: Uneventful            Sign Out: Acceptable/Baseline neuro status   Airway/Respiratory: Uneventful            Sign Out: Acceptable/Baseline resp. status   CV/Hemodynamics: Uneventful            Sign Out: Acceptable CV status; No obvious hypovolemia; No obvious fluid overload   Other NRE: NONE   DID A NON-ROUTINE EVENT OCCUR? No           Last vitals:  Vitals Value Taken Time   /72 07/26/21 1430   Temp 36.1  C (97  F) 07/26/21 1255   Pulse 58 07/26/21 1439   Resp 6 07/26/21 1437   SpO2 97 % 07/26/21 1439   Vitals shown include unvalidated device data.    Electronically Signed By: Wyatt Coleman MD  July 26, 2021  2:43 PM

## 2021-07-26 NOTE — ANESTHESIA PROCEDURE NOTES
Arterial Line Procedure Note  Pre-Procedure   Staff -        Anesthesiologist:  Wyatt Coleman MD       Performed By: anesthesiologist       Location: OR       Procedure Start/Stop Times: 7/26/2021 9:17 AM and 7/26/2021 9:20 AM       Pre-Anesthestic Checklist: patient identified, IV checked, risks and benefits discussed, informed consent, monitors and equipment checked, pre-op evaluation and at physician/surgeon's request  Timeout:       Correct Patient: Yes        Correct Procedure: Yes        Correct Site: Yes        Correct Position: Yes   Procedure   Procedure: arterial line       Diagnosis: cervical stenosis       Laterality: right       Insertion Site: radial.  Sterile Prep        Standard elements of sterile barrier followed       Skin prep: Chloraprep  Insertion/Injection        Catheter Type/Size: 20 G, 12 cm  Narrative         Secured by: suture       Tegaderm dressing used.       Complications: None apparent,        Arterial waveform: Yes        IBP within 10% of NIBP: Yes  Comments:  No US used

## 2021-07-26 NOTE — ANESTHESIA PROCEDURE NOTES
Airway       Patient location during procedure: OR       Procedure Start/Stop Times: 7/26/2021 9:17 AM  Staff -        CRNA: Melvin Robertson APRN CRNA       Performed By: CRNA  Consent for Airway        Urgency: elective  Indications and Patient Condition       Indications for airway management: dodie-procedural       Induction type:intravenous       Mask difficulty assessment: 1 - vent by mask    Final Airway Details       Final airway type: endotracheal airway       Successful airway: ETT - single  Endotracheal Airway Details        ETT size (mm): 7.5       Cuffed: yes       Successful intubation technique: video laryngoscopy       VL Blade Size: MAC 4       Grade View of Cords: 1       Adjucts: stylet       Position: Right       Measured from: gums/teeth       Secured at (cm): 22       Bite block used: Soft    Post intubation assessment        Placement verified by: capnometry, equal breath sounds and chest rise        Number of attempts at approach: 1       Number of other approaches attempted: 0       Secured with: silk tape       Ease of procedure: easy       Dentition: Intact

## 2021-07-27 ENCOUNTER — APPOINTMENT (OUTPATIENT)
Dept: OCCUPATIONAL THERAPY | Facility: HOSPITAL | Age: 76
End: 2021-07-27
Attending: SURGERY
Payer: COMMERCIAL

## 2021-07-27 ENCOUNTER — APPOINTMENT (OUTPATIENT)
Dept: RADIOLOGY | Facility: HOSPITAL | Age: 76
End: 2021-07-27
Attending: NURSE PRACTITIONER
Payer: COMMERCIAL

## 2021-07-27 ENCOUNTER — TELEPHONE (OUTPATIENT)
Dept: NEUROSURGERY | Facility: CLINIC | Age: 76
End: 2021-07-27

## 2021-07-27 ENCOUNTER — APPOINTMENT (OUTPATIENT)
Dept: PHYSICAL THERAPY | Facility: HOSPITAL | Age: 76
End: 2021-07-27
Attending: NURSE PRACTITIONER
Payer: COMMERCIAL

## 2021-07-27 VITALS
RESPIRATION RATE: 16 BRPM | TEMPERATURE: 98.5 F | HEART RATE: 66 BPM | WEIGHT: 189.4 LBS | SYSTOLIC BLOOD PRESSURE: 177 MMHG | BODY MASS INDEX: 25.69 KG/M2 | DIASTOLIC BLOOD PRESSURE: 76 MMHG | OXYGEN SATURATION: 96 %

## 2021-07-27 DIAGNOSIS — M54.12 CERVICAL RADICULOPATHY: Primary | ICD-10-CM

## 2021-07-27 LAB
ANION GAP SERPL CALCULATED.3IONS-SCNC: 8 MMOL/L (ref 5–18)
BUN SERPL-MCNC: 14 MG/DL (ref 8–28)
CALCIUM SERPL-MCNC: 9 MG/DL (ref 8.5–10.5)
CHLORIDE BLD-SCNC: 111 MMOL/L (ref 98–107)
CO2 SERPL-SCNC: 24 MMOL/L (ref 22–31)
CREAT SERPL-MCNC: 1.06 MG/DL (ref 0.7–1.3)
ERYTHROCYTE [DISTWIDTH] IN BLOOD BY AUTOMATED COUNT: 14.5 % (ref 10–15)
GFR SERPL CREATININE-BSD FRML MDRD: 68 ML/MIN/1.73M2
GLUCOSE BLD-MCNC: 130 MG/DL (ref 70–125)
HCT VFR BLD AUTO: 36.4 % (ref 40–53)
HGB BLD-MCNC: 11.8 G/DL (ref 13.3–17.7)
MCH RBC QN AUTO: 26.4 PG (ref 26.5–33)
MCHC RBC AUTO-ENTMCNC: 32.4 G/DL (ref 31.5–36.5)
MCV RBC AUTO: 81 FL (ref 78–100)
PLATELET # BLD AUTO: 360 10E3/UL (ref 150–450)
POTASSIUM BLD-SCNC: 3.9 MMOL/L (ref 3.5–5)
RBC # BLD AUTO: 4.47 10E6/UL (ref 4.4–5.9)
SODIUM SERPL-SCNC: 143 MMOL/L (ref 136–145)
WBC # BLD AUTO: 12.7 10E3/UL (ref 4–11)

## 2021-07-27 PROCEDURE — 80048 BASIC METABOLIC PNL TOTAL CA: CPT | Performed by: NURSE PRACTITIONER

## 2021-07-27 PROCEDURE — 36415 COLL VENOUS BLD VENIPUNCTURE: CPT | Performed by: NURSE PRACTITIONER

## 2021-07-27 PROCEDURE — 72040 X-RAY EXAM NECK SPINE 2-3 VW: CPT

## 2021-07-27 PROCEDURE — 258N000003 HC RX IP 258 OP 636: Performed by: NURSE PRACTITIONER

## 2021-07-27 PROCEDURE — 97535 SELF CARE MNGMENT TRAINING: CPT | Mod: GO

## 2021-07-27 PROCEDURE — 85027 COMPLETE CBC AUTOMATED: CPT | Performed by: NURSE PRACTITIONER

## 2021-07-27 PROCEDURE — 97530 THERAPEUTIC ACTIVITIES: CPT | Mod: GP

## 2021-07-27 PROCEDURE — 97162 PT EVAL MOD COMPLEX 30 MIN: CPT | Mod: GP

## 2021-07-27 PROCEDURE — 97116 GAIT TRAINING THERAPY: CPT | Mod: GP

## 2021-07-27 PROCEDURE — 97166 OT EVAL MOD COMPLEX 45 MIN: CPT | Mod: GO

## 2021-07-27 PROCEDURE — 250N000011 HC RX IP 250 OP 636: Performed by: MASSAGE THERAPIST

## 2021-07-27 PROCEDURE — 250N000013 HC RX MED GY IP 250 OP 250 PS 637: Performed by: NURSE PRACTITIONER

## 2021-07-27 RX ORDER — POLYETHYLENE GLYCOL 3350 17 G/17G
17 POWDER, FOR SOLUTION ORAL DAILY
Qty: 510 G | COMMUNITY
Start: 2021-07-27 | End: 2021-11-09

## 2021-07-27 RX ORDER — LORATADINE 10 MG/1
10 TABLET ORAL DAILY PRN
COMMUNITY
Start: 2021-07-27 | End: 2021-11-09

## 2021-07-27 RX ORDER — ASPIRIN 325 MG
325 TABLET ORAL DAILY
Refills: 0 | COMMUNITY
Start: 2021-07-30 | End: 2024-03-05

## 2021-07-27 RX ORDER — ACETAMINOPHEN 325 MG/1
650 TABLET ORAL EVERY 6 HOURS PRN
Refills: 0 | COMMUNITY
Start: 2021-07-27 | End: 2024-03-05

## 2021-07-27 RX ORDER — AMOXICILLIN 250 MG
1 CAPSULE ORAL 2 TIMES DAILY
COMMUNITY
Start: 2021-07-27 | End: 2021-11-09

## 2021-07-27 RX ORDER — TIZANIDINE 2 MG/1
2 TABLET ORAL EVERY 6 HOURS PRN
Qty: 40 TABLET | Refills: 0 | Status: SHIPPED | OUTPATIENT
Start: 2021-07-27 | End: 2021-11-09

## 2021-07-27 RX ORDER — TIZANIDINE 2 MG/1
2 TABLET ORAL EVERY 6 HOURS PRN
Status: DISCONTINUED | OUTPATIENT
Start: 2021-07-27 | End: 2021-07-27 | Stop reason: HOSPADM

## 2021-07-27 RX ORDER — OXYCODONE HYDROCHLORIDE 5 MG/1
5-10 TABLET ORAL EVERY 4 HOURS PRN
Qty: 50 TABLET | Refills: 0 | Status: SHIPPED | OUTPATIENT
Start: 2021-07-27 | End: 2021-11-09 | Stop reason: SINTOL

## 2021-07-27 RX ORDER — MULTIVITAMIN,THERAPEUTIC
1 TABLET ORAL DAILY
COMMUNITY
Start: 2021-07-28 | End: 2024-03-05

## 2021-07-27 RX ADMIN — DOCUSATE SODIUM 50 MG AND SENNOSIDES 8.6 MG 1 TABLET: 8.6; 5 TABLET, FILM COATED ORAL at 09:39

## 2021-07-27 RX ADMIN — FLUOXETINE 40 MG: 20 CAPSULE ORAL at 09:39

## 2021-07-27 RX ADMIN — LISINOPRIL 20 MG: 20 TABLET ORAL at 09:39

## 2021-07-27 RX ADMIN — SODIUM CHLORIDE 1000 ML: 9 INJECTION, SOLUTION INTRAVENOUS at 03:43

## 2021-07-27 RX ADMIN — METHOCARBAMOL 500 MG: 500 TABLET, FILM COATED ORAL at 00:07

## 2021-07-27 RX ADMIN — THERA TABS 1 TABLET: TAB at 09:39

## 2021-07-27 RX ADMIN — AMLODIPINE BESYLATE 5 MG: 5 TABLET ORAL at 09:39

## 2021-07-27 RX ADMIN — LORAZEPAM 0.5 MG: 2 INJECTION INTRAMUSCULAR; INTRAVENOUS at 00:08

## 2021-07-27 RX ADMIN — ACETAMINOPHEN 975 MG: 325 TABLET ORAL at 13:36

## 2021-07-27 RX ADMIN — OXYCODONE HYDROCHLORIDE 5 MG: 5 TABLET ORAL at 03:46

## 2021-07-27 RX ADMIN — FAMOTIDINE 20 MG: 20 TABLET, FILM COATED ORAL at 09:39

## 2021-07-27 RX ADMIN — ACETAMINOPHEN 975 MG: 325 TABLET ORAL at 03:46

## 2021-07-27 RX ADMIN — POLYETHYLENE GLYCOL 3350 17 G: 17 POWDER, FOR SOLUTION ORAL at 09:40

## 2021-07-27 RX ADMIN — OXYCODONE HYDROCHLORIDE 5 MG: 5 TABLET ORAL at 09:40

## 2021-07-27 ASSESSMENT — ACTIVITIES OF DAILY LIVING (ADL): PREVIOUS_RESPONSIBILITIES: DRIVING;MEDICATION MANAGEMENT

## 2021-07-27 NOTE — TELEPHONE ENCOUNTER
PATIENT NAME:  Shant Muñoz  YOB: 1945  MRN: [id number]  SURGEON: Dr. Layton  DATE of SURGERY: 7/26/2021  PROCEDURE: Removal of cervical 5-cervical 6 anterior plate; Anterior cervical discectomy and arthrodesis cervical 6-cervical 7    FOLLOW-UP:  Wound Check:  7-10 days  Staples/Sutures Out: n/a  Post Op Visit: 6 weeks  Post-op Provider: ABDIRASHID on Dr. Layton clinic day  DIAGNOSTICS: XR  DISPOSITION: Home 07/28/2021    ADDITIONAL INSTRUCTIONS FOR MEDICAL STAFF:        Patrizia Sood RN, CNRN

## 2021-07-27 NOTE — PROGRESS NOTES
[facility]      OUTPATIENT PHYSICAL THERAPY EVALUATION  PLAN OF TREATMENT FOR OUTPATIENT REHABILITATION  (COMPLETE FOR INITIAL CLAIMS ONLY)  Patient's Last Name, First Name, M.I.  YOB: 1945  WandaShant HENDERSON                        Provider's Name  [facility] Medical Record No.  [id number]                               Onset Date:  07/26/21   Start of Care Date:  (P) 07/27/21      Type:     _X_PT   ___OT   ___SLP Medical Diagnosis:  (P) Cervical radiculopathy                        PT Diagnosis:  Impaired functional mobility    Visits from SOC:  1   _________________________________________________________________________________  Plan of Treatment/Functional Goals    Planned Interventions: bed mobility training, gait training, stair training, strengthening, transfer training     Goals: See Physical Therapy Goals on Care Plan in [facility] electronic health record.    Therapy Frequency: Daily  Predicted Duration of Therapy Intervention: 2 days   _________________________________________________________________________________    I CERTIFY THE NEED FOR THESE SERVICES FURNISHED UNDER        THIS PLAN OF TREATMENT AND WHILE UNDER MY CARE     (Physician co-signature of this document indicates review and certification of the therapy plan).                Certification date from: (P) 07/27/21, Certification date to: (P) 08/03/21    Referring Physician: Chelsea Stock APRN CNP            Initial Assessment        See Physical Therapy evaluation dated (P) 07/27/21 in Epic electronic health record.

## 2021-07-27 NOTE — OP NOTE
NEUROSURGERY OPERATIVE REPORT     DATE OF SERVICE: 7/26/2021     PREOPERATIVE DIAGNOSES:  Cervical radiculopathy    POSTOPERATIVE DIAGNOSES:  same    PROCEDURE:   1.  Removal of cervical 5-cervical 6 anterior plate  2.  Anterior cervical discectomy and arthrodesis cervical 6-cervical 7  2.  Use of TC endoskeleton implant 7 mm   3.  Use of anterior cervical plate,   Zevo (Medtronic)  4.  Use of operating room microscope.   5.  Use of neuro monitoring     SURGEON: Nataly Layton MD    ASSISTANT: Chelsea Stock CNP    INDICATIONS:  Shant Muñoz is a 75 year old male who presents today for evaluation of his persistent left upper extremity pain and tingling. He has a history of C5-C6 cervical discectomy and fusion via a right sided incision in 2012 and states that he had similar symptoms of left arm pain prior to surgery of which improved about 80 percent following surgery. MRI cervical spine showed new moderate sized left foraminal disc protrusion which impinges the left C7 nerve. 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 He had no significant improvement with conservative management. Dicussed risks and benefits of removal of C5-6 plate and C6-7 anterior cervical decompression and fusion. He agreed to proceed.     PROCEDURE:  After obtaining informed consent, the patient was brought to the operating room with TEDs and pneumatic stockings in place.  IV antibiotics was administered. He was intubated under general endotracheal anesthesia with her neck in a completely neutral position, no hyperextension.  An arterial line was placed to keep his mean arterial pressure normal throughout the case. He was positioned supine on the operating room table with a bump under the shoulders support behind the neck and the head cradled in a soft headrest leaving the neck in a slightly extended position. His shoulder were taped and retracted as needed to expose the anterior neck for a skin incision. He was prepped and draped in the usual  sterile fashion.        The left sided incision was opened sharply and extended down to the platysma.  The platysma was opened in one layer with sharp dissection and undercut superiorly and inferiorly for ease of reapproximation at the end of the procedure.  We next continued the dissection medial to this sternocleidomastoid muscle.   After identifying the carotid and gently retracting it laterally, as well as identifying the trachea and esophagus which were gently retracted medially with hand-held retractors, to open the dissection.  We continued the dissection sharply and once reaching the loose  Areolar plane we used blunt dissection down to the anterior surface of the vertebral bodies. We verified the cervical 5-cervical 6 prior plate. The screws were removed. His plate had some bone incorporation. An osteotome was used to loosen the plate. This was then also removed. Next we identified the cervical 6-cervical 7 disc space and placed our caspar pins in cervical 6 and cervical 7.The operating microscope was the utilized for the remainder of the case.  The disc was removed with a combination of high speed air drill, Kerrison, curettes and pituitary grabbers until the end plates were free of disc material. The posterior longitudinal ligament was opened with a blunt nerve hook. The remainder of the disc was removed with [place] rongeurs. We verified that the foramen were open with a blunt tip nerve hook. We also removed the cervical 6 overhang with Kerrison rongeur. Next we trialed our interbody graft.  A 7 mm interbody graft was tapped into position using a slight amount of distraction on the [place] pins.  With the graft in good position we released the [place] pins and verified a nice solid fit.  We removed the [place] distractor pins and filled the screw holes with bone wax.   We next fitted an anterior cervical plate plate which was secured into position with four screws drilled into the anterior surfaces of  cervical 6 and cervical 7.    A lateral x-ray was taken prior to the patient leaving the operating room.      Once the construct was in good position, we copiously irrigated the incision with antibiotic-containing saline and achieved hemostasis , and remove the retractor system.  We verified good pulsation in the carotid.  We verified no injury to the trachea /esophagus.  A drain was placed. We closed the platysma with interrupted 2-0 Vicryl ,  inverted 2-0 Vicryl to approximate the subcutaneous tissues to approximate the skin edges.   Exofin was placed. Sponge and needle counts were correct prior to closure x2.      Patient tolerated the procedure well, was taken to the recovery room at neurological baseline with no new deficits appreciated.     Estimated blood loss: <40 ml    Specimens: * No specimens in log *    Implants:   Implant Name Type Inv. Item Serial No.  Lot No. LRB No. Used Action   [employer] TC IMPLANT 6 DEG MED 16 MM X 14 MM- 7 MM Metallic Hardware/[place]   [employer] [id number] N/A 1 Implanted   IMP PLATE CERV MEDT ZEVO 23MM 1 LVL [id number] - [id number] Metallic Hardware/[place] IMP PLATE CERV MEDT ZEVO 23MM 1 LVL [id number]  [employer]  N/A 3 Implanted   IMP SCR MEDT ZEVO 4.0X15MM SD VA [id number] - [id number] Metallic Hardware/[place] IMP SCR MEDT ZEVO 4.0X15MM SD VA [id number]  [employer]  N/A 3 Implanted   IMP SCR MEDT ZEVO 3.5X15MM SD VA [id number] - [id number] Metallic Hardware/[place] IMP SCR MEDT ZEVO 3.5X15MM SD VA [id number]  [employer]  N/A 1 Implanted   GRAFT BONE FIBERS NILES DBM DBF 1ML [id number] - [id number] Bone/Tissue/Biologic GRAFT BONE FIBERS NILSE DBM DBF 1ML [id number] [employer]  N/A 1 Implanted     Findings: C5-6 plate removed. DDD C6-7. Neuro monitoring stable throughout the procedure      Nataly Layton MD    Cc: Martin Smith

## 2021-07-27 NOTE — PLAN OF CARE
Problem: Pain (Spinal Surgery)  Goal: Acceptable Pain Control  Outcome: Improving  Denies any significant pain.      Problem: Postoperative Nausea and Vomiting (Spinal Surgery)  Goal: Nausea and Vomiting Relief  Outcome: Improving  Denied pain.     Patient discharged to home at 1840. Education provided on meds and upcoming appointments. Verbalized understanding. Took all personal belongings with him.

## 2021-07-27 NOTE — PLAN OF CARE
Problem: Functional Ability Impaired (Spinal Surgery)  Goal: Optimal Functional Ability  Outcome: Improving   Pt ambulated to the bathroom x3.  Pt reported insomnia and requested a sleep aid.  MD put in order for Ativan via IV.  Administered.  Not effective.  IV fluids running.  Administered 5mg Oxy due to pain level increasing along with BP.  Pt voiding well and post void bladder scans WNL.  [place] J collar ordered.  Pt is having slight bleeding from penis after Parker pulled.  Will continue to monitor.

## 2021-07-27 NOTE — PROGRESS NOTES
S: Pt seen at [facility] in good spirits post surgery. O: I see the EPIC order for the [place] J collar and Kenn collar. The patient was fit with the size Regular, and given the extra pad set and kenn collar for the shower. Instructions both written and verbal were given and seemed to be understood. P: FU PRN. G: The goal is to stabilize the neck during healing.  Electronically signed Diaz Ospina , LPO.

## 2021-07-27 NOTE — PROGRESS NOTES
07/27/21 1350   Quick Adds   Type of Visit Initial PT Evaluation   Living Environment   People in home alone   Current Living Arrangements apartment   Home Accessibility no concerns   Transportation Anticipated family or friend will provide   Self-Care   Usual Activity Tolerance good   Current Activity Tolerance good   Regular Exercise Yes   Activity/Exercise Type walking   Equipment Currently Used at Home none   Activity/Exercise/Self-Care Comment Indpendent with all ADLs at baseline.    Disability/Function   Concentrating, Remembering or Making Decisions Difficulty no   Difficulty Communicating no   Difficulty Eating/Swallowing no   Walking or Climbing Stairs Difficulty no   Dressing/Bathing Difficulty no   Toileting issues no   Doing Errands Independently Difficulty (such as shopping) yes   General Information   Onset of Illness/Injury or Date of Surgery 07/26/21   Referring Physician Chelsea Stock APRN CNP   Patient/Family Therapy Goals Statement (PT) return home    Pertinent History of Current Problem (include personal factors and/or comorbidities that impact the POC) s/p C6-7 decompression and fusion    Existing Precautions/Restrictions brace worn when out of bed;spinal;other (see comments)  (JAYCOB drain )   Cognition   Orientation Status (Cognition) oriented x 4   Strength   Manual Muscle Testing Quick Adds Strength WFL   Bed Mobility   Bed Mobility supine-sit;sit-supine   Supine-Sit [place] (Bed Mobility) supervision;verbal cues   Sit-Supine [place] (Bed Mobility) supervision;verbal cues   Assistive Device (Bed Mobility) bed rails   Transfers   Transfers sit-stand transfer   Sit-Stand Transfer   Sit-Stand [place] (Transfers) contact guard;verbal cues   Gait/Stairs (Locomotion)   [place] Level (Gait) contact guard   Assistive Device (Gait)   (none)   Distance in Feet (Required for LE Total Joints) 450'   Pattern (Gait) step-through   Deviations/Abnormal Patterns (Gait) gait speed  decreased   Comment (Gait/Stairs) Cues for safety. Pt dem mild LOB while turning in hallway, assist from PT to maintain balance.    Clinical Impression   Criteria for Skilled Therapeutic Intervention yes, treatment indicated   PT Diagnosis (PT) Impaired functional mobility    Influenced by the following impairments Impaired transfers, impaired gait    Functional limitations due to impairments surgery    Clinical Presentation Stable/Uncomplicated   Clinical Presentation Rationale Presents as diagnosed    Clinical Decision Making (Complexity) moderate complexity   Therapy Frequency (PT) Daily   Predicted Duration of Therapy Intervention (days/wks) 2 days    Planned Therapy Interventions (PT) bed mobility training;gait training;stair training;strengthening;transfer training   Anticipated Equipment Needs at Discharge (PT)   (none)   Risk & Benefits of therapy have been explained patient   PT Discharge Planning    PT Discharge Recommendation (DC Rec) home with assist   PT Rationale for DC Rec Pt's sister plans to assist at home.    PT Brief overview of current status  First time amb today, close to baseline. Recommend one more therapy session tomorrow AM before d/c.    Total Evaluation Time   Total Evaluation Time (Minutes) 10     Lorie Cardenas, PT

## 2021-07-27 NOTE — PLAN OF CARE
Problem: Bleeding (Spinal Surgery)  Goal: Absence of Bleeding  Outcome: Improving  Dressing on anterior neck is stained with small amount of bloody drainage. Waldo drain intact.     Problem: Neurologic Impairment (Spinal Surgery)  Goal: Optimal Neurologic Function  Outcome: Improving  Intervention: Optimize Neurologic Function  Recent Flowsheet Documentation  Taken 7/26/2021 1515 by Radha Kohli, RN  Body Position: position changed independently   Has walked back and forth to the restroom twice this shift, is steady on his feet, stand by assist. Encourage ambulation, agreed to walk in the hallway later tonight.     Problem: Pain (Spinal Surgery)  Goal: Acceptable Pain Control  Intervention: Prevent or Manage Pain  Recent Flowsheet Documentation  Taken 7/26/2021 1515 by Radha Kohli, RN  Pain Management Interventions:   distraction   declines   Reports pain is about 4 in left forearm area. Is tolerating oral pain medications. Continue to monitor.    Problem: Adult Inpatient Plan of Care  Goal: Patient-Specific Goal (Individualized)  Outcome: Improving   Has advanced to full liquid diet, has walked in the room some, is voiding on his own, bladder scans so far have been less than 300ml x2. Will continue to monitor.

## 2021-07-27 NOTE — PROGRESS NOTES
Neurosurgery postop progress note    Be is a 74yo M hx C5-6 ACDF in 2012 who is now POD#1 s/p removal of prior C5-6 cervical plate and C6-7 ACDF by Dr Layton on 7/26/21. Preoperatively he c/o left upper extremity pain and tingling.    Be is progressing very well. His left upper extremity pain is now mild and numbness is improving. Sore throat expected given the anterior procedure. Advance diet as tolerated, lozenges for throat soreness prn. He was hypertensive overnight - due to resume his bp meds this morning. If bp remains high, will consult hospitalist team. Please discontinue IV fluids. He needs a collar and a cervical XR today. If he continues to do well and passes PT, could potentially discharge home this afternoon.    Plan:  1. Pain control  2. Postop Cervical XRays today  3. Cervical collar ordered - to be worn when OOB, can get up without it for now  4. Bowel regimen  5. PRN cepacol throat lozenges  6. PT/OT eval  7. DVT ppx lovenox to start 48hrs postop 7/28  8. Holding asa 325mg until POD#5  9. Monitor bp, if remains high consult hospitatlist team  10. Possible discharge this afternoon      HPI:  Mr. Muñoz is a pleasant 75 year old left handed male, who presented to the neurosurgery clinic for evaluation of his persistent left upper extremity pain and tingling. History of C5-C6 cervical discectomy and fusion via a right sided incision in 2012 and states that he had similar symptoms of left arm pain prior to surgery of which improved about 80 percent following surgery. He states that his previous pain was manageable until about a month ago when his pain significantly increased. He describes his pain as a shock like radicular pain that starts in his shoulder and radiates in a C7 distribution down his left arm. His pain is worsened with activity or movement of both his neck and left shoulder. He attempted cervical DAIANA and was unable to receive an injection because of his anatomy and proximity of his  vertebral artery. He does states that when provided a medrol dospak he had relief of his radicular pain but notes that it was not long lasting. He was evaluated by Dr Layton and surgery was recommended. He elected to proceed.    SUBJECTIVE:  Be feels he is doing very well. He has mild left arm pain postop, much better than preop. The tingling in his left arm is a little better from preop. He has some throat soreness but no difficulty swallowing- tolerating pudding consistency. He denies any weakness, imbalance, or change in bowel or bladder control. Parker was removed, he has had no difficulty voiding. Would like to shower      Exam:  General: seated upright in bed, appears comfortable in NAD.   Strength: full throughout both upper and lower extremities, all planes. MAEx4    Sensation of upper and lower extremities is intact and equal throughout    Incision: covered with a dressing. Some old-appearing sang staining on dressing.      Imaging:  Cervical XR ordered, needs to be completed    Labs:  Ordered cbc, bmp        Thania Kohli FNP-C  [facility] Neurosurgery  O. [phone]

## 2021-07-28 NOTE — PLAN OF CARE
Physical Therapy Discharge Summary    Reason for therapy discharge:    Discharged to home.    Progress towards therapy goal(s). See goals on Care Plan in [facility] electronic health record for goal details.  Goals not met.  Barriers to achieving goals:   discharge from facility.Patient seen for initial evaluation only.    Therapy recommendation(s):    Continue home exercise program.

## 2021-07-28 NOTE — PROGRESS NOTES
07/27/21 1545   Quick Adds   Type of Visit Initial Occupational Therapy Evaluation   Living Environment   People in home alone   Current Living Arrangements apartment   Self-Care   Usual Activity Tolerance good   Current Activity Tolerance good   Regular Exercise Yes   Activity/Exercise Type walking   Exercise Amount/Frequency other (see comments)  (1-2 miles)   Equipment Currently Used at Home   (standard toilet, walk in shower)   Activity/Exercise/Self-Care Comment independent   Instrumental Activities of Daily Living (IADL)   Previous Responsibilities driving;medication management  (getting assist with cleaning since injury, )   IADL Comments states son could do shopping   Disability/Function   Fall history within last six months yes   Number of times patient has fallen within last six months 2   Change in Functional Status Since Onset of Current Illness/Injury yes   General Information   Onset of Illness/Injury or Date of Surgery 07/26/21   Patient/Family Therapy Goal Statement (OT) stay overnight and possible TCU if not feeling better   Additional Occupational Profile Info/Pertinent History of Current Problem moderate   Existing Precautions/Restrictions spinal  (miami j collar)   Cognitive Status Examination   Orientation Status orientation to person, place and time   Range of Motion Comprehensive   General Range of Motion bilateral upper extremity ROM WNL   Comment, General Range of Motion states pain in L arm since injury to neck and continues after surgery   Bed Mobility   Bed Mobility supine-sit;sit-supine   Sit-Supine [place] (Bed Mobility) verbal cues;supervision   Assistive Device (Bed Mobility) other (see comments)  (HOB elevated per prec., reviewed need to elevate HOB)   Transfers   Transfers sit-stand transfer   Transfer Comments sit to stand from chair SBA- cues for hand placement and proper tech.   Balance   Balance Assessment standing balance: dynamic   Standing Balance: Dynamic fair  balance  (states gets dizzy due to dehydration and has fallen x 2 )   Clinical Impression   Criteria for Skilled Therapeutic Interventions Met (OT) yes   OT Diagnosis cervical surgery   OT Problem List-Impairments impacting ADL mobility;balance;activity tolerance impaired   Assessment of Occupational Performance 3-5 Performance Deficits   Planned Therapy Interventions (OT) ADL retraining;bed mobility training;transfer training;home program guidelines   Clinical Decision Making Complexity (OT) moderate complexity   Therapy Frequency (OT) Daily   Predicted Duration of Therapy 4 days   Anticipated Equipment Needs Upon Discharge (OT) raised toilet seat;reacher   OT Discharge Planning    OT Discharge Recommendation (DC Rec) Transitional Care Facility  (states no one is able to stay with him, not comfortable)   Total Evaluation Time (Minutes)   Total Evaluation Time (Minutes) 15        07/27/21 1545   Quick Adds   Type of Visit Initial Occupational Therapy Evaluation   Living Environment   People in home alone   Current Living Arrangements apartment   Self-Care   Usual Activity Tolerance good   Current Activity Tolerance good   Regular Exercise Yes   Activity/Exercise Type walking   Exercise Amount/Frequency other (see comments)  (1-2 miles)   Equipment Currently Used at Home   (standard toilet, walk in shower)   Activity/Exercise/Self-Care Comment independent   Instrumental Activities of Daily Living (IADL)   Previous Responsibilities driving;medication management  (getting assist with cleaning since injury, )   IADL Comments states son could do shopping   Disability/Function   Fall history within last six months yes   Number of times patient has fallen within last six months 2   Change in Functional Status Since Onset of Current Illness/Injury yes   General Information   Onset of Illness/Injury or Date of Surgery 07/26/21   Patient/Family Therapy Goal Statement (OT) stay overnight and possible TCU if not feeling better    Additional Occupational Profile Info/Pertinent History of Current Problem moderate   Existing Precautions/Restrictions spinal  (miami j collar)   Cognitive Status Examination   Orientation Status orientation to person, place and time   Range of Motion Comprehensive   General Range of Motion bilateral upper extremity ROM WNL   Comment, General Range of Motion states pain in L arm since injury to neck and continues after surgery   Bed Mobility   Bed Mobility supine-sit;sit-supine   Sit-Supine [place] (Bed Mobility) verbal cues;supervision   Assistive Device (Bed Mobility) other (see comments)  (HOB elevated per prec., reviewed need to elevate HOB)   Transfers   Transfers sit-stand transfer   Transfer Comments sit to stand from chair SBA- cues for hand placement and proper tech.   Balance   Balance Assessment standing balance: dynamic   Standing Balance: Dynamic fair balance  (states gets dizzy due to dehydration and has fallen x 2 )   Clinical Impression   Criteria for Skilled Therapeutic Interventions Met (OT) yes   OT Diagnosis cervical surgery   OT Problem List-Impairments impacting ADL mobility;balance;activity tolerance impaired   Assessment of Occupational Performance 3-5 Performance Deficits   Planned Therapy Interventions (OT) ADL retraining;bed mobility training;transfer training;home program guidelines   Clinical Decision Making Complexity (OT) moderate complexity   Therapy Frequency (OT) Daily   Predicted Duration of Therapy 4 days   Anticipated Equipment Needs Upon Discharge (OT) raised toilet seat;reacher   OT Discharge Planning    OT Discharge Recommendation (DC Rec) Transitional Care Facility  (states no one is able to stay with him, not comfortable)   Total Evaluation Time (Minutes)   Total Evaluation Time (Minutes) 15

## 2021-07-28 NOTE — PLAN OF CARE
Occupational Therapy Discharge Summary    Reason for therapy discharge:    Discharged to home.    Progress towards therapy goal(s). See goals on Care Plan in [facility] electronic health record for goal details.  Goals partially met.  Barriers to achieving goals:   discharge from facility.    Therapy recommendation(s):    Continued therapy is recommended.  Rationale/Recommendations:  Pt. Lives alone, limited support per pt., fall history and goals not met.

## 2021-08-02 ENCOUNTER — ALLIED HEALTH/NURSE VISIT (OUTPATIENT)
Dept: NEUROSURGERY | Facility: CLINIC | Age: 76
End: 2021-08-02
Payer: COMMERCIAL

## 2021-08-02 VITALS — DIASTOLIC BLOOD PRESSURE: 84 MMHG | RESPIRATION RATE: 18 BRPM | SYSTOLIC BLOOD PRESSURE: 128 MMHG | HEART RATE: 76 BPM

## 2021-08-02 DIAGNOSIS — M54.12 CERVICAL RADICULOPATHY: Primary | ICD-10-CM

## 2021-08-02 PROCEDURE — 99207 PR NO CHARGE NURSE ONLY: CPT

## 2021-08-02 NOTE — PROGRESS NOTES
Shant Muñoz is status post Removal of cervical 5-cervical 6 anterior plate; Anterior cervical discectomy and arthrodesis cervical 6-cervical 7 on 07/26/2021 with Dr. Layton.  Preoperatively presented with his persistent left upper extremity pain and tingling.  Today he returns in follow up for wound check. He is accompanied by his sister. He is doing reasonably well - reports much improved pain and tingling in his left arm. Denies weakness in UE. Takes Tylenol prn for intermittent ache in his neck. Gait and balance are normal. Wears a [place] J collar.    Surgical wound WNL - CDI, no signs of infection or skin breakdown.  Incision well-healed: good skin approximation, no redness or visible/palpable edema, no tenderness to palpation.  PT. AF, denies fever, chills or sweats.  Pt. reports that the symptoms are improved from pre-op.    Patrizia Sood RN, CNRN

## 2021-08-02 NOTE — PATIENT INSTRUCTIONS
A dressing is not required.    Keep the wound clean.    Wash your hands before touching the wound.  Ensure that anyone assisting you in the care of your wound washes her/his hands before touching the wound. Good handwashing can decrease the risk of serious infection.    If you are unable to see your wound, have someone check the wound daily for redness, swelling,or drainage. A small amount of drainage is normal.    You may shower.  Pat the wound dry. Do not rub.    No tub baths until the wound is well healed.  Usually 5-6 weeks.     If you develop redness, swelling, drainage, or temp 101 or greater, call our clinic.        * No lifting, pushing or pulling greater than 5-10 pound (this is about a gallon of milk) for the first 6 weeks after surgery .  *No repetitive bending, twisting, or jarring activities for 6 weeks.  *No overhead work  *No aerobic or strenuous activity  *No activities with increased risk of falls  *You may move about your home as tolerated  *You may walk up and down stairs as tolerated  *You may increase your activity slowly over the next 6 weeks    WALKING PROGRAM: As you can tolerate, walk daily-start with 5-10 minutes of continuous walking. This is in addition to the walking that you do as part of your daily activities. Increase the time that you walk by 5 minutes every couple of days. Do not exceed 30-45 minutes of continuous walking until seen in follow-up. Walking is the best exercise after surgery.  **Listen to your body, if you find that you are more painful or fatigued, you may need to proceed more slowly.    **Do not smoke or expose yourself to second hand smoke. Cigarette smoke can delay healing and cause complications.     DRIVING:  We recommend that you do not drive while taking medications for pain or muscle spasms. Always read and follow the advice on your prescription bottle. If you have questions, speak with your pharmacist.  We recommend that you do not drive while wearing a  brace, as it could limit your range of motion.    WORK: If you plan to return to work before you 4-6 weeks appointment, call and discuss with one of the nurses in the neurosurgery office.

## 2021-08-22 ENCOUNTER — HEALTH MAINTENANCE LETTER (OUTPATIENT)
Age: 76
End: 2021-08-22

## 2021-09-08 ENCOUNTER — OFFICE VISIT (OUTPATIENT)
Dept: NEUROSURGERY | Facility: CLINIC | Age: 76
End: 2021-09-08
Payer: COMMERCIAL

## 2021-09-08 ENCOUNTER — HOSPITAL ENCOUNTER (OUTPATIENT)
Dept: RADIOLOGY | Facility: HOSPITAL | Age: 76
Discharge: HOME | End: 2021-09-08
Attending: SURGERY | Admitting: SURGERY
Payer: COMMERCIAL

## 2021-09-08 VITALS — OXYGEN SATURATION: 97 % | HEART RATE: 63 BPM | RESPIRATION RATE: 18 BRPM

## 2021-09-08 DIAGNOSIS — M54.12 CERVICAL RADICULOPATHY: Primary | ICD-10-CM

## 2021-09-08 DIAGNOSIS — M54.12 CERVICAL RADICULOPATHY: ICD-10-CM

## 2021-09-08 PROCEDURE — 72040 X-RAY EXAM NECK SPINE 2-3 VW: CPT

## 2021-09-08 PROCEDURE — 99213 OFFICE O/P EST LOW 20 MIN: CPT | Performed by: NURSE PRACTITIONER

## 2021-09-08 NOTE — PATIENT INSTRUCTIONS
Be is doing very well. Minimal neck pain into L shoulder. This should improve as he gets further out from surgery. Cervical hardware look good without complication on today's Xrays.    Plan:  1. Wean collar to off  2. Advance activity with PT, lift 10-15lbs initially, then increase by 5lbs per week until max of 30lbs until next appt  3. OK to play saxophone, flute  4. Follow up in 6wks with NP/PA on Dr Layton clinic day with new cervical XR  5. Call sooner if questions or concerns arise.      Thania Kohli FNP-C  [facility] Neurosurgery  O. [phone]

## 2021-09-08 NOTE — PROGRESS NOTES
Neurosurgery progress note:    A/P:  Be is a 76yo M hx C5-6 ACDF in 2012 who is approx 6wks s/p removal of prior C5-6 cervical plate and C6-7 ACDF by Dr Layton on 7/26/21. Preoperatively he c/o left upper extremity pain and tingling.    Today Be feels great. He has no further left arm pain from preop. Arms are getting stronger. Minimal intermittent neck pain into the left shoulder only. Wears the collar whenever he's outside only. No further bilateral leg pains from preop. No concerns about his incision. He is neuro intact today and painless full range of motion of neck when collar was removed. Hardware appears stable on cervical xrays. Cleared to advance activity with PT and wean out of collar. Plan for follow up in another 6 wk with new XRays.      Plan:  1. Cervical collar wean to off. OK to submerge incision. OK to play [employer].  2. OK to lift 10-15lbs, increase by 5lbs/wk up to max of 30lbs until next appt  3. Start PT to advance activity  4. Return in 6wks for follow up appt with NP/PA on Layton day with new cervical XR      Exam:  General: seated in NAD appears comfortable, wearing [place] J collar which appears quite loose    Strength is full throughout both upper and lower extremities  Sensation is intact to light touch throughout both upper and lower extremities    Incision is CDI, slightly raised    Gait is smooth, coordinated    Neck ROM is full and painless       Imaging:  Personally reviewed cervical XR from today 9/8. Also reviewed by Dr Layton at time of appt.    Hardware appears stable without complication.       Thania Kohli FNP-C  [facility] Neurosurgery  O. [phone]

## 2021-09-08 NOTE — LETTER
9/8/2021         RE: Shant Muñoz  [address]  [place] [address]        Dear Colleague,    Thank you for referring your patient, Shant Muñoz, to the [facility]. Please see a copy of my visit note below.    Neurosurgery progress note:    A/P:  Be is a 76yo M hx C5-6 ACDF in 2012 who is approx 6wks s/p removal of prior C5-6 cervical plate and C6-7 ACDF by Dr Layton on 7/26/21. Preoperatively he c/o left upper extremity pain and tingling.    Today Be feels great. He has no further left arm pain from preop. Arms are getting stronger. Minimal intermittent neck pain into the left shoulder only. Wears the collar whenever he's outside only. No further bilateral leg pains from preop. No concerns about his incision. He is neuro intact today and painless full range of motion of neck when collar was removed. Hardware appears stable on cervical xrays. Cleared to advance activity with PT and wean out of collar. Plan for follow up in another 6 wk with new XRays.      Plan:  1. Cervical collar wean to off. OK to submerge incision. OK to play [employer].  2. OK to lift 10-15lbs, increase by 5lbs/wk up to max of 30lbs until next appt  3. Start PT to advance activity  4. Return in 6wks for follow up appt with NP/PA on Layton day with new cervical XR      Exam:  General: seated in NAD appears comfortable, wearing [place] J collar which appears quite loose    Strength is full throughout both upper and lower extremities  Sensation is intact to light touch throughout both upper and lower extremities    Incision is CDI, slightly raised    Gait is smooth, coordinated    Neck ROM is full and painless       Imaging:  Personally reviewed cervical XR from today 9/8. Also reviewed by Dr Layton at time of appt.    Hardware appears stable without complication.       Thania Kohli FNP-C  [facility] Neurosurgery  O. [phone]        Again, thank you for allowing me to  participate in the care of your patient.        Sincerely,        RAUL Crook CNP

## 2021-09-10 ENCOUNTER — HOSPITAL ENCOUNTER (OUTPATIENT)
Dept: PHYSICAL THERAPY | Facility: REHABILITATION | Age: 76
End: 2021-09-10
Attending: NURSE PRACTITIONER
Payer: COMMERCIAL

## 2021-09-10 DIAGNOSIS — M54.12 CERVICAL RADICULOPATHY: ICD-10-CM

## 2021-09-10 PROCEDURE — 97161 PT EVAL LOW COMPLEX 20 MIN: CPT | Mod: GP | Performed by: PHYSICAL THERAPIST

## 2021-09-10 PROCEDURE — 97110 THERAPEUTIC EXERCISES: CPT | Mod: GP | Performed by: PHYSICAL THERAPIST

## 2021-09-11 NOTE — PROGRESS NOTES
Rehabilitation Services          OUTPATIENT PHYSICAL THERAPY ORTHOPEDIC EVALUATION  PLAN OF TREATMENT FOR OUTPATIENT REHABILITATION  (COMPLETE FOR INITIAL CLAIMS ONLY)  Patient's Last Name, First Name, M.I.  YOB: 1945  Shant Muñoz    Provider s Name:  Polly Da Silva PT   Medical Record No.  [id number]   Start of Care Date:  09/10/21   Onset Date:  07/27/21   Type:     _X__PT   ___OT   ___SLP Medical Diagnosis:  (P) cervical radiculopathy     PT Diagnosis:  (P) cervical radiculopathy s/p fusion   Visits from SOC:  1      _________________________________________________________________________________  Plan of Treatment/Functional Goals:  (P) joint mobilization, neuromuscular re-education, ROM, strengthening, stretching     (P) Cryotherapy, Hot packs, TENS  (P) check precautions first before doing any modality  Goals  Goal Identifier: (P) HEP  Goal Description: (P) The patient will demonstrate independence in HEP to aid in home management of symptoms  Target Date: (P) 11/09/21    Goal Identifier: (P) Walk for 30 minutes   Goal Description: (P) The patient will be able to walk for 30 minutes without increased pain and will be able to do this without cervical brace  Target Date: (P) 12/09/21    Goal Identifier: (P) demonstrate good posture in sitting  Goal Description: (P) Te patient will demonstrate good posture using lumbar support if needed and will be able to sit without increase in pain and the patient will have the pain only in the neck and not in the upper arm   Target Date: (P) 12/09/21         Therapy Frequency:  (P) 2 times/Week  Predicted Duration of Therapy Intervention:  (P) 12 weeks    Polly Da Silva PT                 I CERTIFY THE NEED FOR THESE SERVICES FURNISHED UNDER        THIS PLAN OF TREATMENT AND WHILE UNDER MY CARE     (Physician co-signature of this document indicates review and certification of the therapy plan).                       Certification Date  From:  (P) 09/10/21   Certification Date To:  (P) 12/09/21    Referring Provider:  Thania Kohli APRN CNP    Initial Assessment        See Epic Evaluation Start of Care Date: 09/10/21                09/10/21 0900   General Information   Type of Visit Initial OP Ortho PT Evaluation   Start of Care Date 09/10/21   Referring Physician Thania Kohli APRN CNP   Patient/Family Goals Statement Reduced pain   Orders Evaluate and Treat   Date of Order 09/08/21   Certification Required? Yes   Medical Diagnosis cervical radiculopathy   Body Part(s)   Body Part(s) Cervical Spine   Presentation and Etiology   Pertinent history of current problem (include personal factors and/or comorbidities that impact the POC) Pain started after a fall on May 6 2021, surgery was July 27 anterior decompression of the cervical spine with fusion with hardware rested for 1 month and then went in and 1 month and went in and is to start PT.  No lifting over 15 pounds able to drive.It is healing wonderfully.  Wore neck brace until 9/8/21.  Wear brace with walking but not with driving  not wearing at home.  Pain 1/10 on the posterior and lateral neck occasionally down into the forearm. The pain is constant 0.5 to a 2/10 pain when more active. Has had this surgery 10 years ago and the fall aggravated it.     Impairments A. Pain;D. Decreased ROM;E. Decreased flexibility;F. Decreased strength and endurance   Functional Limitations perform desired leisure / sports activities   Symptom Location Posterior and lateral neck and into the left upper trap lateral upper arm and occasionally into the forearm.   How/Where did it occur With a fall   Onset date of current episode/exacerbation 07/27/21   Chronicity New   Pain rating (0-10 point scale) Best (/10);Worst (/10)   Best (/10) 0.5   Worst (/10) 2   Pain quality C. Aching;H. Other   Pain quality comment electrical pain   Frequency of pain/symptoms A. Constant   Pain/symptoms are: Other    Pain symptoms comment worse with activity   Pain/symptoms exacerbated by B. Walking;C. Lifting;M. Other   Pain exacerbation comment cannot play saxophone due to the weight and the stamina   Pain/symptoms eased by I. OTC medication(s);J. Braces/supports   Progression of symptoms since onset: Improved   Current / Previous Interventions   Diagnostic Tests: X-ray   Fall Risk Screen   Fall screen completed by PT   Have you fallen 2 or more times in the past year? Yes   Have you fallen and had an injury in the past year? Yes   Timed Up and Go score (seconds) 12.5   Is patient a fall risk? Yes   Abuse Screen (yes response referral indicated)   Feels Unsafe at Home or Work/School no   Feels Threatened by Someone no   Does Anyone Try to Keep You From Having Contact with Others or Doing Things Outside Your Home? no   Physical Signs of Abuse Present no   Cervical Spine   Observation Patient tends to sit with his legs crossed,slouched forward rounded shoulders slightly forward head, slight shift to the right with cervical spine. patient has a lumbar and thoracic scoliosis and neck is sifted slightly to the right but spine is straight..   Cervical Flexion ROM 5 fingers to chest   Cervical Extension ROM 12 degrees   Cervical Right Rotation ROM 35 right rotation   Cervical Left Rotation ROM 25 left rotation    Shoulder ER (C5, C6) Strength 5-/5 bilaterally   Shoulder IR (C5, C6) Strength 5/5 bilaterally   Elbow Flexion (C5, C6) Strength 5/5 bilaterally   Elbow Extension (C7) Strength 5/5 bilaterally   Wrist Extension (C6) Strength 5/5 bilaterally   Wrist Flexion (C7) Strength 5/5 bilaterally   Palpation muscle tightness C4,5,6 anteriorly 28% NDI   Biceps Reflexes WNL   Brachioradialis Reflexes WNL   Triceps Reflexes WNL   Planned Therapy Interventions   Planned Therapy Interventions joint mobilization;neuromuscular re-education;ROM;strengthening;stretching   Planned Modality Interventions   Planned Modality Interventions  Cryotherapy;Hot packs;TENS   Planned Modality Interventions Comments check precautions first before doing any modality   Clinical Impression   Criteria for Skilled Therapeutic Interventions Met yes, treatment indicated   PT Diagnosis cervical radiculopathy s/p fusion   Influenced by the following impairments decreased stabilization strength slight decreased ROM and increased pain   Functional limitations due to impairments pain with walking and weakness in the neck decreased tolerance to being upright.   Clinical Presentation Stable/Uncomplicated   Clinical Decision Making (Complexity) Low complexity   Therapy Frequency 2 times/Week   Predicted Duration of Therapy Intervention (days/wks) 12 weeks   Risk & Benefits of therapy have been explained Yes   Patient, Family & other staff in agreement with plan of care Yes   Clinical Impression Comments The patient is doing well after surgery limited in the ROM, strength and endurance and still has some cervical radiculopathy   ORTHO GOALS   PT Ortho Eval Goals 2;1;3   Ortho Goal 1   Goal Identifier HEP   Goal Description The patient will demonstrate independence in HEP to aid in home management of symptoms   Target Date 11/09/21   Ortho Goal 2   Goal Identifier Walk for 30 minutes    Goal Description The patient will be able to walk for 30 minutes without increased pain and will be able to do this without cervical brace   Target Date 12/09/21   Ortho Goal 3   Goal Identifier demonstrate good posture in sitting   Goal Description Te patient will demonstrate good posture using lumbar support if needed and will be able to sit without increase in pain and the patient will have the pain only in the neck and not in the upper arm    Target Date 12/09/21   Total Evaluation Time   PT Eval, Low Complexity Minutes (91978) 35   Therapy Certification   Certification date from 09/10/21   Certification date to 12/09/21   Medical Diagnosis cervical radiculopathy

## 2021-09-24 ENCOUNTER — HOSPITAL ENCOUNTER (OUTPATIENT)
Dept: PHYSICAL THERAPY | Facility: REHABILITATION | Age: 76
End: 2021-09-24
Payer: COMMERCIAL

## 2021-09-24 DIAGNOSIS — M54.12 CERVICAL RADICULOPATHY: Primary | ICD-10-CM

## 2021-09-24 PROCEDURE — 97140 MANUAL THERAPY 1/> REGIONS: CPT | Mod: GP | Performed by: PHYSICAL THERAPIST

## 2021-09-24 PROCEDURE — 97110 THERAPEUTIC EXERCISES: CPT | Mod: GP | Performed by: PHYSICAL THERAPIST

## 2021-10-08 ENCOUNTER — HOSPITAL ENCOUNTER (OUTPATIENT)
Dept: PHYSICAL THERAPY | Facility: REHABILITATION | Age: 76
End: 2021-10-08
Payer: COMMERCIAL

## 2021-10-08 DIAGNOSIS — M54.12 CERVICAL RADICULOPATHY: Primary | ICD-10-CM

## 2021-10-08 PROCEDURE — 97110 THERAPEUTIC EXERCISES: CPT | Mod: GP | Performed by: PHYSICAL THERAPIST

## 2021-10-08 PROCEDURE — 97140 MANUAL THERAPY 1/> REGIONS: CPT | Mod: GP | Performed by: PHYSICAL THERAPIST

## 2021-10-22 ENCOUNTER — HOSPITAL ENCOUNTER (OUTPATIENT)
Dept: PHYSICAL THERAPY | Facility: REHABILITATION | Age: 76
End: 2021-10-22
Payer: COMMERCIAL

## 2021-10-22 DIAGNOSIS — M54.12 CERVICAL RADICULOPATHY: Primary | ICD-10-CM

## 2021-10-22 PROCEDURE — 97110 THERAPEUTIC EXERCISES: CPT | Mod: GP | Performed by: PHYSICAL THERAPIST

## 2021-10-29 ENCOUNTER — TELEPHONE (OUTPATIENT)
Dept: PHYSICAL THERAPY | Facility: REHABILITATION | Age: 76
End: 2021-10-29

## 2021-10-29 NOTE — TELEPHONE ENCOUNTER
Called patient he reported that he wasn't feeling well and went home due to a stomach issue.  He reports that the neck is not improving the pain is intermittent buIt still goes into his left arm.     Georgina Da Silva PT

## 2021-11-03 ENCOUNTER — OFFICE VISIT (OUTPATIENT)
Dept: NEUROSURGERY | Facility: CLINIC | Age: 76
End: 2021-11-03
Payer: COMMERCIAL

## 2021-11-03 ENCOUNTER — HOSPITAL ENCOUNTER (OUTPATIENT)
Dept: MRI IMAGING | Facility: HOSPITAL | Age: 76
End: 2021-11-03
Attending: NURSE PRACTITIONER
Payer: COMMERCIAL

## 2021-11-03 ENCOUNTER — HOSPITAL ENCOUNTER (OUTPATIENT)
Dept: RADIOLOGY | Facility: HOSPITAL | Age: 76
End: 2021-11-03
Attending: NURSE PRACTITIONER
Payer: COMMERCIAL

## 2021-11-03 VITALS
HEART RATE: 76 BPM | DIASTOLIC BLOOD PRESSURE: 74 MMHG | SYSTOLIC BLOOD PRESSURE: 137 MMHG | OXYGEN SATURATION: 97 % | RESPIRATION RATE: 18 BRPM

## 2021-11-03 DIAGNOSIS — M54.12 CERVICAL RADICULOPATHY: ICD-10-CM

## 2021-11-03 DIAGNOSIS — M54.12 CERVICAL RADICULOPATHY: Primary | ICD-10-CM

## 2021-11-03 PROCEDURE — 72040 X-RAY EXAM NECK SPINE 2-3 VW: CPT

## 2021-11-03 PROCEDURE — 99213 OFFICE O/P EST LOW 20 MIN: CPT | Performed by: NURSE PRACTITIONER

## 2021-11-03 PROCEDURE — 72156 MRI NECK SPINE W/O & W/DYE: CPT

## 2021-11-03 PROCEDURE — 255N000002 HC RX 255 OP 636: Performed by: NURSE PRACTITIONER

## 2021-11-03 PROCEDURE — A9585 GADOBUTROL INJECTION: HCPCS | Performed by: NURSE PRACTITIONER

## 2021-11-03 RX ORDER — TRAMADOL HYDROCHLORIDE 50 MG/1
25-50 TABLET ORAL EVERY 6 HOURS PRN
Qty: 20 TABLET | Refills: 0 | Status: SHIPPED | OUTPATIENT
Start: 2021-11-03 | End: 2024-03-05

## 2021-11-03 RX ORDER — GADOBUTROL 604.72 MG/ML
8 INJECTION INTRAVENOUS ONCE
Status: COMPLETED | OUTPATIENT
Start: 2021-11-03 | End: 2021-11-03

## 2021-11-03 RX ADMIN — GADOBUTROL 8 ML: 604.72 INJECTION INTRAVENOUS at 19:49

## 2021-11-03 NOTE — LETTER
11/3/2021         RE: Shant Muñoz  [address]  [facility] [address]        Dear Colleague,    Thank you for referring your patient, Shant Muñoz, to the [facility]. Please see a copy of my visit note below.    Neurosurgery Progress Note  11/3/2021    A/P: Shant Muñoz is a 76 year old male S/P removal of prior C5-6 plate and C6-7 ACDF with Dr Layton 7/26/2021. XR today look stable.     Pain left shoulder. No numb or tingling. No neck pain. No numbness or tingling. Pain is better than pre-op but was better 2 months ago than it is now. No new injury. Feels slight weakness with walking and feels unsteady. Recognizes heightened anxiety which causes loss of appetite. Plans to discuss with PCP tomorrow. Does not respond to muscle relaxer. Is taking tylenol with little relief. Unable to take NSAIDS since he is a fusion. No longer taking gabapentin. Small amount of tramadol filled today.  reviewed and is in order. Physical therapy is not really helping. For activity he tries to walk but his arm hurts him so much he is limited in distance. He has not had his shoulder worked up in the past. Will order cervical MRI today as he states this is the pain he has always had when his neck has required surgery. If negative, may need shoulder work up and may benefit from visit with Spine center. He may just need to give recovery some more time. He is in agreement with plan of care as is Dr Layton.     PLAN  1. MRI and see Dr Layton afterwards  2. Spine center if MRI negative   3. Continue PT as you are able   4. Massage   5. Avoid NSAIDS    HPI: Mr. Muñoz is a pleasant 75 year old left handed male, who presented to the neurosurgery clinic for evaluation of his persistent left upper extremity pain and tingling. History of C5-C6 cervical discectomy and fusion via a right sided incision in 2012 and states that he had similar symptoms of left arm pain prior to surgery of  which improved about 80 percent following surgery. He states that his previous pain was manageable until about a month ago when his pain significantly increased. He describes his pain as a shock like radicular pain that starts in his shoulder and radiates in a C7 distribution down his left arm. His pain is worsened with activity or movement of both his neck and left shoulder. He attempted cervical DAIANA and was unable to receive an injection because of his anatomy and proximity of his vertebral artery. He does states that when provided a medrol dospak he had relief of his radicular pain but notes that it was not long lasting.     Physical Exam  /74   Pulse 76   Resp 18   SpO2 97%     General Exam: alert, oriented. Slightly anxious.     Motor: normal for patient     Strength: full strength all extremities     Sensation: intact to light touch     Reflexes: no hyperreflexia     Images: reviewed current and prior films with patient, and Dr Layton     Incision: healed    ARMANDO Pierce  [facility] Neurosurgery  O: [phone]      NDI: 26%      Again, thank you for allowing me to participate in the care of your patient.        Sincerely,        RAUL Villarreal CNP

## 2021-11-03 NOTE — PROGRESS NOTES
Neurosurgery Progress Note  11/3/2021    A/P: Shant Muñoz is a 76 year old male S/P removal of prior C5-6 plate and C6-7 ACDF with Dr Layton 7/26/2021. XR today look stable.     Pain left shoulder. No numb or tingling. No neck pain. No numbness or tingling. Pain is better than pre-op but was better 2 months ago than it is now. No new injury. Feels slight weakness with walking and feels unsteady. Recognizes heightened anxiety which causes loss of appetite. Plans to discuss with PCP tomorrow. Does not respond to muscle relaxer. Is taking tylenol with little relief. Unable to take NSAIDS since he is a fusion. No longer taking gabapentin. Small amount of tramadol filled today.  reviewed and is in order. Physical therapy is not really helping. For activity he tries to walk but his arm hurts him so much he is limited in distance. He has not had his shoulder worked up in the past. Will order cervical MRI today as he states this is the pain he has always had when his neck has required surgery. If negative, may need shoulder work up and may benefit from visit with Spine center. He may just need to give recovery some more time. He is in agreement with plan of care as is Dr Layton.     PLAN  1. MRI and see Dr Layton afterwards  2. Spine center if MRI negative   3. Continue PT as you are able   4. Massage   5. Avoid NSAIDS    HPI: Mr. Muñoz is a pleasant 75 year old left handed male, who presented to the neurosurgery clinic for evaluation of his persistent left upper extremity pain and tingling. History of C5-C6 cervical discectomy and fusion via a right sided incision in 2012 and states that he had similar symptoms of left arm pain prior to surgery of which improved about 80 percent following surgery. He states that his previous pain was manageable until about a month ago when his pain significantly increased. He describes his pain as a shock like radicular pain that starts in his shoulder and radiates in a C7  distribution down his left arm. His pain is worsened with activity or movement of both his neck and left shoulder. He attempted cervical DAIANA and was unable to receive an injection because of his anatomy and proximity of his vertebral artery. He does states that when provided a medrol dospak he had relief of his radicular pain but notes that it was not long lasting.     Physical Exam  /74   Pulse 76   Resp 18   SpO2 97%     General Exam: alert, oriented. Slightly anxious.     Motor: normal for patient     Strength: full strength all extremities     Sensation: intact to light touch     Reflexes: no hyperreflexia     Images: reviewed current and prior films with patient, and Dr Layton     Incision: healed    LISA Pierce-CNP  [facility] Neurosurgery  O: [phone]

## 2021-11-03 NOTE — PATIENT INSTRUCTIONS
1. MRI cervical spine and see Dr Layton afterwards  2. Continue physical therapy  3. DO NOT TAKE  NSAIDs  for 6 months after fusion surgery, as these medications may delay bone healing  NSAIDS include such drugs as:  Aspirin, Ibuprofen, Motrin, Advil, Aleve, Naproxen     4. Could consider massage  5. Spine Center referral - symptom management if MRI negative. Injection potential.

## 2021-11-04 ENCOUNTER — MYC MEDICAL ADVICE (OUTPATIENT)
Dept: NEUROSURGERY | Facility: CLINIC | Age: 76
End: 2021-11-04

## 2021-11-05 ENCOUNTER — HOSPITAL ENCOUNTER (OUTPATIENT)
Dept: PHYSICAL THERAPY | Facility: REHABILITATION | Age: 76
End: 2021-11-05
Payer: COMMERCIAL

## 2021-11-05 ENCOUNTER — TELEPHONE (OUTPATIENT)
Dept: NEUROSURGERY | Facility: CLINIC | Age: 76
End: 2021-11-05

## 2021-11-05 ENCOUNTER — MYC MEDICAL ADVICE (OUTPATIENT)
Dept: NEUROSURGERY | Facility: CLINIC | Age: 76
End: 2021-11-05

## 2021-11-05 DIAGNOSIS — M25.512 PAIN IN JOINT OF LEFT SHOULDER: Primary | ICD-10-CM

## 2021-11-05 DIAGNOSIS — M54.12 CERVICAL RADICULOPATHY: Primary | ICD-10-CM

## 2021-11-05 PROCEDURE — 97140 MANUAL THERAPY 1/> REGIONS: CPT | Mod: GP | Performed by: PHYSICAL THERAPIST

## 2021-11-05 NOTE — PROGRESS NOTES
Beginning/End Dates of Progress Note Reporting Period:  9/10/21 to11/5/21  Will discharge PT as the patient has not returned to PT to continue with HEP at home and continue with MD as needed.  Progress Toward Goals:   Progress this reporting period: see below    Client Self (Subjective) Report for Progress Note Reporting Period: (P) Pain in the triceps 0.5 to 1.5/10, no neck pain today        Objective Measurements:   Objective Measure: cervical ROM  Details: 2.5 fingers to the chest,  52 degrees right  48 degrees left notice upper trap pain after on the left upper trap, cervical extension 27  Objective Measure: palpation  Details: (P) Neutral cervical spine, and left rotated T12., ulnar nerve AROM assessment pain in the posterior interosseus nerve with AROM .  Manual assessment tightness in the medial cord of the brachial plexus and then down the ulnar nerve to the posterior interosseous nerve and the superficial ulnar nerve on the palmar aspect of the proximal hand today.       11/05/21 0900   Signing Clinician's Name / Credentials   Signing clinician's name / credentials Georgina Da Silva PT   Session Number   Session Number 5   Additional Session Number 12   Session Tracking, Supervision and Quick Adds   PT Assistant Visit Number 0   AT Visit Number 12   Progress Note/Recertification   Progress Note Due Date 11/09/21   Recertification Due Date 12/09/21   Adult Goals   PT Ortho Eval Goals 2;1;3   Ortho Goal 1   Goal Identifier HEP   Goal Description The patient will demonstrate independence in HEP to aid in home management of symptoms   Goal Progress spreading the exercises out during the day no pain   Target Date 11/09/21   Date Met 11/05/21   Ortho Goal 2   Goal Identifier Walk for 30 minutes    Goal Description The patient will be able to walk for 30 minutes without increased pain and will be able to do this without cervical brace   Goal Progress doing 30 minutes every other day without painmet but wants to do  more length of time and more often   Target Date 12/09/21   Ortho Goal 3   Goal Identifier demonstrate good posture in sitting   Goal Description The patient will demonstrate good posture using lumbar support if needed and will be able to sit without increase in pain and the patient will have the pain only in the neck and not in the upper arm .   Goal Progress pain sometimes but is getting better with sitting upright.  occasional pain    Target Date 12/09/21   Subjective Report   Subjective Report Pain in the triceps 0.5 to 1.5/10, no neck pain today   Objective Measures   Objective Measures Objective Measure 1;Objective Measure 2   Objective Measure 1   Objective Measure cervical ROM   Details 2.5 fingers to the chest,  52 degrees right  48 degrees left notice upper trap pain after on the left upper trap, cervical extension 27   Objective Measure 2   Objective Measure palpation   Details Neutral cervical spine, and left rotated T12., ulnar nerve AROM assessment pain in the posterior interosseus nerve with AROM .  Manual assessment tightness in the medial cord of the brachial plexus and then down the ulnar nerve to the posterior interosseous nerve and the superficial ulnar nerve on the palmar aspect of the proximal hand today   Treatment Interventions   Interventions Therapeutic Procedure/Exercise;Manual Therapy   Therapeutic Procedure/exercise   Therapeutic Procedures: strength, endurance, ROM, flexibillity minutes (73276) 7   Skilled Intervention added ulnar nerve mobilization prayer/ fish mobilization   Manual Therapy   Manual Therapy: Mobilization, MFR, MLD, friction massage minutes (02707) 25   Skilled Intervention Manual neural moilization to the medial cord of the brachial plexus and then down the ulnar nerve to the posterior interosseous nerve and the superficial ulnar nerve on the palmar aspect of the proximal hand    Plan   Home program Reviewed seated chin tucks and pectoral corner stretch and or one arm  pect stretch,  Hold on  wall push ups x 10 reps.   Standing scap rows with orange thera band. Discussed putting exercises, at different times of the day.     Plan for next session assess ulnar nerve gliding and add cervical  isometrics as able Go over neutral spine in sitting and standing, go over posture and body mechanics add in stabilization when he is ready.  Reassess spine ROM and spine position assess lumbar spine and see if wall circles are needed.  progress to stabilization as tolerated and when allowed with postsurgical   Comments   Comments patient is having increased pain with increased activity he is to decrease his walking and have ihis exercises at different tisha of the day and reassess   Total Session Time   Timed Code Treatment Minutes 28   Total Treatment Time (sum of timed and untimed services) 28   AMBULATORY CLINICS ONLY-MEDICAL AND TREATMENT DIAGNOSIS   Medical Diagnosis cervical radiculopathy   PT Diagnosis cervical radiculopathy s/p fusion

## 2021-11-05 NOTE — TELEPHONE ENCOUNTER
Discussed MRI results with Be as well as Dr Layton. Referral placed to spine center to see if injections would be an option. Patient to see Dr Layton after spine center consultation.     LISA Pierce-CNP  [facility] Neurosurgery  O: [phone]

## 2021-11-08 NOTE — TELEPHONE ENCOUNTER
Neurosurgery telephone note    Reviewed pt's mri results. No significant central canal stenosis. No hardware complication. 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     D/w Dr Layton who recommends C DAIANA  She plans to call him Monday as well  Pt agrees with plan      Thania VÁSQUEZP-C  [facility] Neurosurgery  O. [phone]

## 2021-11-09 ENCOUNTER — OFFICE VISIT (OUTPATIENT)
Dept: PHYSICAL MEDICINE AND REHAB | Facility: CLINIC | Age: 76
End: 2021-11-09
Payer: COMMERCIAL

## 2021-11-09 ENCOUNTER — MEDICAL CORRESPONDENCE (OUTPATIENT)
Dept: HEALTH INFORMATION MANAGEMENT | Facility: CLINIC | Age: 76
End: 2021-11-09

## 2021-11-09 DIAGNOSIS — Z98.1 HISTORY OF FUSION OF CERVICAL SPINE: ICD-10-CM

## 2021-11-09 DIAGNOSIS — M50.20 CERVICAL DISC HERNIATION: ICD-10-CM

## 2021-11-09 DIAGNOSIS — M48.02 FORAMINAL STENOSIS OF CERVICAL REGION: ICD-10-CM

## 2021-11-09 DIAGNOSIS — M54.12 RADICULITIS OF LEFT CERVICAL REGION: Primary | ICD-10-CM

## 2021-11-09 PROCEDURE — 99215 OFFICE O/P EST HI 40 MIN: CPT | Performed by: NURSE PRACTITIONER

## 2021-11-09 NOTE — LETTER
11/9/2021         RE: Shant Muñoz  [address]  [facility] [address]        Dear Colleague,    Thank you for referring your patient, Shant Muñoz, to the [facility] [place]. Please see a copy of my visit note below.    ASSESSMENT: Shant Muñoz is a 76 year old male presents for consultation at the request of PCP Martin Smith, with past medical history significant for hyperlipidemia, hypertension, orthostatic hypotension, aortic stenosis, type 2 diabetes mellitus, acute kidney injury, depression, insomnia, cervical radiculopathy, who presents today for new patient evaluation of :     -Left cervical radiculitis post spinal fusion.  MRI with improved post surgery, however still moderate disc herniation C6-7 with moderate left foraminal stenosis.    Patient is neurologically intact on exam. No myelopathic or red flag symptoms.         Diagnoses and all orders for this visit:  Radiculitis of left cervical region  Cervical disc herniation  Foraminal stenosis of cervical region  History of fusion of cervical spine     PLAN:  Reviewed spine anatomy and disease process. Discussed diagnosis and treatment options with the patient today. A shared decision making model was used. The patient's values and choices were respected. The following represents what was discussed and decided upon by the provider and the patient.     -DIAGNOSTIC TESTS:  Images were personally reviewed and interpreted and explained to patient today using spine model.   --Cervical spine MRI 11/3/2021 with straightening of cervical lordosis. Solid fusion at C5-6 with anterior hardware hardware at C6-7. C6-7 left disc herniation with moderate left foraminal stenosis.  --Cervical x-ray 11/3/2021 with anterior discectomy and fusion hardware at C6-7. Solid fusion at C5-6.    -PHYSICAL THERAPY: Advised patient to continue with home exercises as tolerated at this time from prior physical therapy  sessions.  Discussed the importance of core strengthening, ROM, stretching exercises with the patient and how each of these entities is important in decreasing pain.  Explained to the patient that the purpose of physical therapy is to teach the patient a home exercise program.  These exercises need to be performed every day in order to decrease pain and prevent future occurrences of pain.  Likened it to brushing one's teeth.      -MEDICATIONS: No changes in medications at this time  Discussed multiple medication options today with patient. Discussed risks, side effects, and proper use of medications. Patient verbalized understanding.    -We did discuss behavioral health therapy as a recommendation for his ongoing depression issues post surgery, patient defers at this time but will think about it and let us know if he changes his mind.    -INTERVENTIONS:    C7-T1 interlaminar epidural steroid injection ordered by neurosurgery 11/5/2021, this is recommended trial as well at this time as he does have moderate left foraminal stenosis at C6-7.  Patient is scheduled for the injection with Dr. Fischer 11/15/2021.  -If no benefit with this injection we can trial a left C6-7 TFESI with Dr. Melo.  Discussed risks and benefits of injections with patient today.    -PATIENT EDUCATION: Total time of 45 minutes, on the day of service, spent with the patient, reviewing the chart, placing orders, and documenting.   -Today we also discussed the issues related to the current COVID-19 pandemic, the pros and cons of the current treatment plan, the CDC guidelines such as social distancing, washing the hands, masking, and covering the cough.    -FOLLOW-UP:   Follow-up with Dr. Fischer for DAIANA    Advised patient to call the Spine Center if symptoms worsen or you have problems controlling bladder and bowel function.   ______________________________________________________________________    SUBJECTIVE:   Shant Muñoz  is a 76  year old male who presents today for new patient evaluation of neck pain ongoing left neck pain that radiates in the left post scapular region deltoid and into the bicep area that stops at the elbow that is ongoing since surgery.  He reports that his pain is dramatically better postsurgery however still has some lingering pain which is frustrating.    Currently his pain is a 2/10, typically is a 2 at its worst.  He denies any pain below the elbow.  Denies any recent trips or falls or balance changes, denies bowel or bladder loss control, denies right arm symptoms.  Patient is left-hand dominant.    Patient does report that since surgery has had some concerns with depressed mood.  Does feel that his pain is typically better if he is feeling more depressed where is when he is feeling less depressed his pain is more bothersome.  Patient denies thoughts of harming self or others today.    Patient's sister was present today during entire visit and added to past medical/surgical/family/social history and history of presenting illness.     -Treatment to Date: Anterior cervical fusion and discectomy 2/16/2012 [facility].  Removal C5-6 cervical plate Dr. Layton 7/26/2021    -Medications:  Acetaminophen 650 mg as needed    Prior failed medications:  Tramadol with depression side effect  Gabapentin presurgery - ineffectiveness    Current Outpatient Medications   Medication     acetaminophen (TYLENOL) 325 MG tablet     amLODIPine (NORVASC) 5 MG tablet     aspirin (ASA) 325 MG tablet     FLUoxetine (PROZAC) 40 MG capsule     lisinopriL (PRINIVIL,ZESTRIL) 20 MG tablet     multivitamin, therapeutic (THERA-VIT) TABS tablet     pravastatin (PRAVACHOL) 80 MG tablet     traMADol (ULTRAM) 50 MG tablet     No current facility-administered medications for this visit.       No Known Allergies    Past Medical History:   Diagnosis Date     Diabetes (H)      Hypertension      Syncope, unspecified syncope type         Patient Active  Problem List   Diagnosis     Type 2 Diabetes Mellitus     Hyperlipidemia     Benign Essential Hypertension     Joint Pain, Localized In The Shoulder     Cervical Radiculopathy     Insomnia     Depression     Supine hypertension     Cervical radiculopathy     Syncope, unspecified syncope type     SERJIO (acute kidney injury) (H)     Orthostatic hypotension     Mild aortic stenosis       Past Surgical History:   Procedure Laterality Date     ANTERIOR FUSION CERVICAL SPINE  07/26/2021    C5-6 plate removal and C6-7 decompression and fusion     FUSION CERVICAL ANTERIOR ONE LEVEL N/A 7/26/2021    Procedure: CERVICAL 6-CERVICAL 7 ANTERIOR CERVICAL DECOMPRESSION AND FUSION;  Surgeon: Nataly Layton MD;  Location: [facility] [place], [place] OR     IR CERVICAL EPIDURAL STEROID INJECTION  1/19/2012     IR CERVICAL EPIDURAL STEROID INJECTION  6/25/2021     REMOVE HARDWARE CERVICAL ANTERIOR SPINE N/A 7/26/2021    Procedure: REMOVAL OF PRIOR CERVICAL 5-CERVICAL 6 CERVICAL PLATE;  Surgeon: Nataly Layton MD;  Location: [facility] [place], [place] OR     Reviewed past medical, surgical, and family history with patient found on new patient intake packet located in EMR Media tab.     SOCIAL HX: Patient is  and retired, used to work in a railroad.  Patient denies smoking/tobacco use.  Does report occasional light alcohol use, denies history being heavy drinker, denies recreational drug use.    ROS: Positive for insomnia, anxiety/depression, diarrhea since constipation.  Specifically negative for bowel/bladder dysfunction, balance changes, headache, dizziness, foot drop, fevers, chills, appetite changes, nausea/vomiting, unexplained weight loss. Otherwise 13 systems reviewed are negative. Please see the patient's intake questionnaire from today for details.    OBJECTIVE:    PHYSICAL EXAMINATION:  --CONSTITUTIONAL: Vital signs as above. No acute distress. The patient is well nourished and well groomed.  --PSYCHIATRIC: The patient is awake,  alert, oriented to person, place, time and answering questions appropriately with clear speech. Appropriate mood and affect   --HEENT: Sclera are non-injected. Extraocular muscles are intact. Thyroid moves easily upon swallowing.  Moist oral mucosa.  --SKIN: Skin over the face, bilateral upper extremities, and posterior torso is clean, dry, intact without rashes.  --LYMPH NODES: No palpable or tender anterior/posterior cervical, submandibular, or supraclavicular lymph nodes.    --RESPIRATORY: Normal rhythm and effort. No abnormal accessory muscle breathing patterns noted.   --GROSS MOTOR: Easily arises from a seated position. Toe walking and heel walking are normal.    --CERVICAL SPINE: Inspection reveals no evidence of deformity. Range of motion is not limited in cervical flexion, extension, lateral rotation. No tenderness to palpation cervical spine.  Spurling maneuver negative bilaterally.  --SHOULDERS: Full range of motion bilaterally: abduction, flexion, cross chest movement internal/external rotation. Negative empty can.  --UPPER EXTREMITY MOTOR TESTING:  Wrist flexion left 5/5, right 5/5  Wrist extension left 5/5, right 5/5  Pronators left 5/5, right 5/5  Biceps left 5/5, right 5/5   Triceps left 5/5, right 5/5   Shoulder abduction left 5/5, right 5/5   left 5/5, right 5/5  --NEUROLOGIC: CN III-XII are grossly intact. 2/4 symmetric biceps, brachioradialis, triceps reflexes bilaterally. Sensation to upper extremities is intact.  Negative Beltrán's bilaterally.    --VASCULAR: 2/4 radial pulses bilaterally. Warm upper limbs bilaterally. Capillary refill in the upper extremities is less than 1 second.    RESULTS: Prior medical records from [facility] and [facility] were reviewed today.     Imaging:  Cervical spine Imaging was personally reviewed and interpreted today. The images were shown to the patient and the findings were explained using a spine model.      XR Cervical Spine 2/3 Views  Result  Date: 11/3/2021  EXAM: XR CERVICAL SPINE 2/3 VWS LOCATION: [facility] DATE/TIME: 11/3/2021 12:11 PM INDICATION: Followup cervical spine fusion. COMPARISON: 9/8/2021. TECHNIQUE: CR Cervical Spine.   IMPRESSION: Straightening of the usual cervical lordosis without subluxation. Solid-appearing interbody fusion at C5-C6. Anterior cervical discectomy and fusion hardware at C6-C7 appears unchanged.       MR Cervical Spine w/o & w Contrast  Result Date: 11/4/2021  EXAM: MR CERVICAL SPINE W/O and W CONTRAST LOCATION: [facility] DATE/TIME: 11/3/2021 7:12 PM INDICATION: Cervical radiculopathy. COMPARISON: Cervical spine radiograph 11/3/2021, cervical spine MRI 6/24/2021, neck CT 6/24/2021 CONTRAST: 8 mL Gadavist. TECHNIQUE: MRI Cervical Spine without and with IV contrast. FINDINGS: Straightening of the usual cervical lordosis with minimal retrolisthesis of C6 on C7. Solid-appearing interbody fusion at C5-C6. Artifact related to anterior fusion hardware at C6-C7. Normal spinal cord signal. Mild edema on the left affecting the C6-C7 endplates. No pathologic cervical spine enhancement. Partially imaged soft tissue mass in the deep left parotid space as demonstrated on the comparison neck CT. C2-C3: Normal disc height. No herniation. Minor facet arthropathy. No stenosis. C3-C4: Normal disc height. Right foraminal disc osteophyte complex. Mild to moderate facet arthropathy. No central stenosis. Moderate right and mild left foraminal narrowing unchanged. C4-C5: Normal disc height. Minimal annular bulging. Mild facet arthropathy. No stenosis. C5-C6: Postoperative changes. Unremarkable facets. No stenosis. C6-C7: Interspace partially obscured by hardware related artifact. Small right and moderate left foraminal disc herniations. The left-sided disc herniation is slightly diminished versus prior. Unremarkable facets. Adequate central canal. Moderate left and mild right foraminal  narrowing. C7-T1: Normal disc height. No herniation. Mild to moderate left and mild right facet arthropathy. No stenosis.   IMPRESSION: 1.  At C3-C4 moderate right foraminal narrowing is unchanged. 2.  At C6-C7 a left foraminal disc herniation is slightly diminished although there is moderate persistent foraminal narrowing. 3.  Up to mild foraminal narrowing elsewhere. 4.  Adequate central canal. 5.  Changes of anterior fusion at C5-C6 and C6-C7. 6.  Left parotid space mass better visualized on the comparison neck CT.          Again, thank you for allowing me to participate in the care of your patient.        Sincerely,        Hope Segundo, CNP

## 2021-11-09 NOTE — PATIENT INSTRUCTIONS
~Please call our [facility] Nurse Navigation line [phone] with any questions or concerns about your treatment plan, if symptoms worsen and you would like to be seen urgently, or if you have problems controlling bladder and bowel function.      An injection has been ordered today to potentially help with your pain symptoms. These injections do not fix what is going on in your back, therefore they typically do not take away the pain completely, however they can many times help improve symptoms. Injections should always be completed along with other modalities such as physical therapy for the best long term outcomes. If injections alone are done, then pain will likely return.     [facility] Injection Requirements      A  is required for all fluoroscopically-guided injections.    Injection appointments may be cancelled if there are signs/symptoms of an active infection or if the patient is being actively treated with antibiotics for a diagnosed infection.    Patients may have their steroid injection cancelled if they have had another steroid injection within 2 weeks.    Diabetic patients will have their blood glucose levels checked the day of their injection and the appointment will be rescheduled if the blood glucose level is 300 or higher.    Patients with allergies to cortisone, local anesthetics, iodine, or contrast dye should contact the Spine Center to further discuss these considerations.    Patients scheduled for medial branch block diagnostic injections should refrain from taking pain medication the day of the procedure.  The medial branch block injection appointment will be rescheduled if the patient's pain rating is not 5/10 or greater at the time of the procedure.    Patients taking warfarin/Coumadin will have their INR checked the day of the procedure and the procedure may be rescheduled if the INR is greater than 3.0.    Please contact the Spine Center  [phone]) if you are taking any prescription blood-thinning medications (warfarin, Plavix, Lovenox, Eliquis, Brilinta, Effient, etc.) as special dosing adjustments may need to be made depending on the type of injection you are scheduled to receive.    It is recommended that you delay having your steroid injection if you have received a flu shot or shingles vaccine within 2 weeks.

## 2021-11-09 NOTE — PROGRESS NOTES
ASSESSMENT: Shant Muñoz is a 76 year old male presents for consultation at the request of PCP Martin Smith, with past medical history significant for hyperlipidemia, hypertension, orthostatic hypotension, aortic stenosis, type 2 diabetes mellitus, acute kidney injury, depression, insomnia, cervical radiculopathy, who presents today for new patient evaluation of :     -Left cervical radiculitis post spinal fusion.  MRI with improved post surgery, however still moderate disc herniation C6-7 with moderate left foraminal stenosis.    Patient is neurologically intact on exam. No myelopathic or red flag symptoms.         Diagnoses and all orders for this visit:  Radiculitis of left cervical region  Cervical disc herniation  Foraminal stenosis of cervical region  History of fusion of cervical spine     PLAN:  Reviewed spine anatomy and disease process. Discussed diagnosis and treatment options with the patient today. A shared decision making model was used. The patient's values and choices were respected. The following represents what was discussed and decided upon by the provider and the patient.     -DIAGNOSTIC TESTS:  Images were personally reviewed and interpreted and explained to patient today using spine model.   --Cervical spine MRI 11/3/2021 with straightening of cervical lordosis. Solid fusion at C5-6 with anterior hardware hardware at C6-7. C6-7 left disc herniation with moderate left foraminal stenosis.  --Cervical x-ray 11/3/2021 with anterior discectomy and fusion hardware at C6-7. Solid fusion at C5-6.    -PHYSICAL THERAPY: Advised patient to continue with home exercises as tolerated at this time from prior physical therapy sessions.  Discussed the importance of core strengthening, ROM, stretching exercises with the patient and how each of these entities is important in decreasing pain.  Explained to the patient that the purpose of physical therapy is to teach the patient a home exercise program.   These exercises need to be performed every day in order to decrease pain and prevent future occurrences of pain.  Likened it to brushing one's teeth.      -MEDICATIONS: No changes in medications at this time  Discussed multiple medication options today with patient. Discussed risks, side effects, and proper use of medications. Patient verbalized understanding.    -We did discuss behavioral health therapy as a recommendation for his ongoing depression issues post surgery, patient defers at this time but will think about it and let us know if he changes his mind.    -INTERVENTIONS:    C7-T1 interlaminar epidural steroid injection ordered by neurosurgery 11/5/2021, this is recommended trial as well at this time as he does have moderate left foraminal stenosis at C6-7.  Patient is scheduled for the injection with Dr. Fischer 11/15/2021.  -If no benefit with this injection we can trial a left C6-7 TFESI with Dr. Melo.  Discussed risks and benefits of injections with patient today.    -PATIENT EDUCATION: Total time of 45 minutes, on the day of service, spent with the patient, reviewing the chart, placing orders, and documenting.   -Today we also discussed the issues related to the current COVID-19 pandemic, the pros and cons of the current treatment plan, the CDC guidelines such as social distancing, washing the hands, masking, and covering the cough.    -FOLLOW-UP:   Follow-up with Dr. Fischer for DAIANA    Advised patient to call the Spine Center if symptoms worsen or you have problems controlling bladder and bowel function.   ______________________________________________________________________    SUBJECTIVE:   Shant Muñoz  is a 76 year old male who presents today for new patient evaluation of neck pain ongoing left neck pain that radiates in the left post scapular region deltoid and into the bicep area that stops at the elbow that is ongoing since surgery.  He reports that his pain is dramatically better  postsurgery however still has some lingering pain which is frustrating.    Currently his pain is a 2/10, typically is a 2 at its worst.  He denies any pain below the elbow.  Denies any recent trips or falls or balance changes, denies bowel or bladder loss control, denies right arm symptoms.  Patient is left-hand dominant.    Patient does report that since surgery has had some concerns with depressed mood.  Does feel that his pain is typically better if he is feeling more depressed where is when he is feeling less depressed his pain is more bothersome.  Patient denies thoughts of harming self or others today.    Patient's sister was present today during entire visit and added to past medical/surgical/family/social history and history of presenting illness.     -Treatment to Date: Anterior cervical fusion and discectomy 2/16/2012 [facility].  Removal C5-6 cervical plate Dr. Layton 7/26/2021    -Medications:  Acetaminophen 650 mg as needed    Prior failed medications:  Tramadol with depression side effect  Gabapentin presurgery - ineffectiveness    Current Outpatient Medications   Medication     acetaminophen (TYLENOL) 325 MG tablet     amLODIPine (NORVASC) 5 MG tablet     aspirin (ASA) 325 MG tablet     FLUoxetine (PROZAC) 40 MG capsule     lisinopriL (PRINIVIL,ZESTRIL) 20 MG tablet     multivitamin, therapeutic (THERA-VIT) TABS tablet     pravastatin (PRAVACHOL) 80 MG tablet     traMADol (ULTRAM) 50 MG tablet     No current facility-administered medications for this visit.       No Known Allergies    Past Medical History:   Diagnosis Date     Diabetes (H)      Hypertension      Syncope, unspecified syncope type         Patient Active Problem List   Diagnosis     Type 2 Diabetes Mellitus     Hyperlipidemia     Benign Essential Hypertension     Joint Pain, Localized In The Shoulder     Cervical Radiculopathy     Insomnia     Depression     Supine hypertension     Cervical radiculopathy     Syncope,  unspecified syncope type     SERJIO (acute kidney injury) (H)     Orthostatic hypotension     Mild aortic stenosis       Past Surgical History:   Procedure Laterality Date     ANTERIOR FUSION CERVICAL SPINE  07/26/2021    C5-6 plate removal and C6-7 decompression and fusion     FUSION CERVICAL ANTERIOR ONE LEVEL N/A 7/26/2021    Procedure: CERVICAL 6-CERVICAL 7 ANTERIOR CERVICAL DECOMPRESSION AND FUSION;  Surgeon: Nataly Layton MD;  [place] [facility] [place]     IR CERVICAL EPIDURAL STEROID INJECTION  1/19/2012     IR CERVICAL EPIDURAL STEROID INJECTION  6/25/2021     REMOVE HARDWARE CERVICAL ANTERIOR SPINE N/A 7/26/2021    Procedure: REMOVAL OF PRIOR CERVICAL 5-CERVICAL 6 CERVICAL PLATE;  Surgeon: Nataly Layton MD;  [place] [facility] [place]     Reviewed past medical, surgical, and family history with patient found on new patient intake packet located in EMR Media tab.     SOCIAL HX: Patient is  and retired, used to work in a railroad.  Patient denies smoking/tobacco use.  Does report occasional light alcohol use, denies history being heavy drinker, denies recreational drug use.    ROS: Positive for insomnia, anxiety/depression, diarrhea since constipation.  Specifically negative for bowel/bladder dysfunction, balance changes, headache, dizziness, foot drop, fevers, chills, appetite changes, nausea/vomiting, unexplained weight loss. Otherwise 13 systems reviewed are negative. Please see the patient's intake questionnaire from today for details.    OBJECTIVE:    PHYSICAL EXAMINATION:  --CONSTITUTIONAL: Vital signs as above. No acute distress. The patient is well nourished and well groomed.  --PSYCHIATRIC: The patient is awake, alert, oriented to person, place, time and answering questions appropriately with clear speech. Appropriate mood and affect   --HEENT: Sclera are non-injected. Extraocular muscles are intact. Thyroid moves easily upon swallowing.  Moist oral mucosa.  --SKIN: Skin over  the face, bilateral upper extremities, and posterior torso is clean, dry, intact without rashes.  --LYMPH NODES: No palpable or tender anterior/posterior cervical, submandibular, or supraclavicular lymph nodes.    --RESPIRATORY: Normal rhythm and effort. No abnormal accessory muscle breathing patterns noted.   --GROSS MOTOR: Easily arises from a seated position. Toe walking and heel walking are normal.    --CERVICAL SPINE: Inspection reveals no evidence of deformity. Range of motion is not limited in cervical flexion, extension, lateral rotation. No tenderness to palpation cervical spine.  Spurling maneuver negative bilaterally.  --SHOULDERS: Full range of motion bilaterally: abduction, flexion, cross chest movement internal/external rotation. Negative empty can.  --UPPER EXTREMITY MOTOR TESTING:  Wrist flexion left 5/5, right 5/5  Wrist extension left 5/5, right 5/5  Pronators left 5/5, right 5/5  Biceps left 5/5, right 5/5   Triceps left 5/5, right 5/5   Shoulder abduction left 5/5, right 5/5   left 5/5, right 5/5  --NEUROLOGIC: CN III-XII are grossly intact. 2/4 symmetric biceps, brachioradialis, triceps reflexes bilaterally. Sensation to upper extremities is intact.  Negative Beltrán's bilaterally.    --VASCULAR: 2/4 radial pulses bilaterally. Warm upper limbs bilaterally. Capillary refill in the upper extremities is less than 1 second.    RESULTS: Prior medical records from [facility] were reviewed today.     Imaging:  Cervical spine Imaging was personally reviewed and interpreted today. The images were shown to the patient and the findings were explained using a spine model.      XR Cervical Spine 2/3 Views  Result Date: 11/3/2021  EXAM: XR CERVICAL SPINE 2/3 VWS LOCATION: [facility] DATE/TIME: 11/3/2021 12:11 PM INDICATION: Followup cervical spine fusion. COMPARISON: 9/8/2021. TECHNIQUE: CR Cervical Spine.   IMPRESSION: Straightening of the usual  cervical lordosis without subluxation. Solid-appearing interbody fusion at C5-C6. Anterior cervical discectomy and fusion hardware at C6-C7 appears unchanged.       MR Cervical Spine w/o & w Contrast  Result Date: 11/4/2021  EXAM: MR CERVICAL SPINE W/O and W CONTRAST LOCATION: [facility] DATE/TIME: 11/3/2021 7:12 PM INDICATION: Cervical radiculopathy. COMPARISON: Cervical spine radiograph 11/3/2021, cervical spine MRI 6/24/2021, neck CT 6/24/2021 CONTRAST: 8 mL Gadavist. TECHNIQUE: MRI Cervical Spine without and with IV contrast. FINDINGS: Straightening of the usual cervical lordosis with minimal retrolisthesis of C6 on C7. Solid-appearing interbody fusion at C5-C6. Artifact related to anterior fusion hardware at C6-C7. Normal spinal cord signal. Mild edema on the left affecting the C6-C7 endplates. No pathologic cervical spine enhancement. Partially imaged soft tissue mass in the deep left parotid space as demonstrated on the comparison neck CT. C2-C3: Normal disc height. No herniation. Minor facet arthropathy. No stenosis. C3-C4: Normal disc height. Right foraminal disc osteophyte complex. Mild to moderate facet arthropathy. No central stenosis. Moderate right and mild left foraminal narrowing unchanged. C4-C5: Normal disc height. Minimal annular bulging. Mild facet arthropathy. No stenosis. C5-C6: Postoperative changes. Unremarkable facets. No stenosis. C6-C7: Interspace partially obscured by hardware related artifact. Small right and moderate left foraminal disc herniations. The left-sided disc herniation is slightly diminished versus prior. Unremarkable facets. Adequate central canal. Moderate left and mild right foraminal narrowing. C7-T1: Normal disc height. No herniation. Mild to moderate left and mild right facet arthropathy. No stenosis.   IMPRESSION: 1.  At C3-C4 moderate right foraminal narrowing is unchanged. 2.  At C6-C7 a left foraminal disc herniation is slightly diminished  although there is moderate persistent foraminal narrowing. 3.  Up to mild foraminal narrowing elsewhere. 4.  Adequate central canal. 5.  Changes of anterior fusion at C5-C6 and C6-C7. 6.  Left parotid space mass better visualized on the comparison neck CT.

## 2021-11-15 ENCOUNTER — ANCILLARY PROCEDURE (OUTPATIENT)
Dept: PHYSICAL MEDICINE AND REHAB | Facility: CLINIC | Age: 76
End: 2021-11-15
Attending: NURSE PRACTITIONER
Payer: COMMERCIAL

## 2021-11-15 VITALS
OXYGEN SATURATION: 98 % | TEMPERATURE: 98.1 F | SYSTOLIC BLOOD PRESSURE: 136 MMHG | DIASTOLIC BLOOD PRESSURE: 82 MMHG | HEART RATE: 64 BPM

## 2021-11-15 DIAGNOSIS — M54.12 CERVICAL RADICULOPATHY: ICD-10-CM

## 2021-11-15 DIAGNOSIS — Q76.1 CERVICAL FUSION SYNDROME: ICD-10-CM

## 2021-11-15 DIAGNOSIS — G89.4 CHRONIC PAIN SYNDROME: Primary | ICD-10-CM

## 2021-11-15 PROCEDURE — 62321 NJX INTERLAMINAR CRV/THRC: CPT | Performed by: PAIN MEDICINE

## 2021-11-15 RX ORDER — DEXAMETHASONE SODIUM PHOSPHATE 10 MG/ML
INJECTION, SOLUTION INTRAMUSCULAR; INTRAVENOUS
Status: COMPLETED | OUTPATIENT
Start: 2021-11-15 | End: 2021-11-15

## 2021-11-15 RX ORDER — LIDOCAINE HYDROCHLORIDE 10 MG/ML
INJECTION, SOLUTION EPIDURAL; INFILTRATION; INTRACAUDAL; PERINEURAL
Status: COMPLETED | OUTPATIENT
Start: 2021-11-15 | End: 2021-11-15

## 2021-11-15 RX ADMIN — DEXAMETHASONE SODIUM PHOSPHATE 10 MG: 10 INJECTION, SOLUTION INTRAMUSCULAR; INTRAVENOUS at 13:27

## 2021-11-15 RX ADMIN — LIDOCAINE HYDROCHLORIDE 1 ML: 10 INJECTION, SOLUTION EPIDURAL; INFILTRATION; INTRACAUDAL; PERINEURAL at 13:25

## 2021-11-15 ASSESSMENT — PAIN SCALES - GENERAL
PAINLEVEL: NO PAIN (1)
PAINLEVEL: NO PAIN (1)

## 2021-11-15 NOTE — LETTER
11/15/2021         RE: Shant Muñoz  [address]  [place] [address]        Dear Colleague,    Thank you for referring your patient, Shant Muñoz, to the [facility]. Please see a copy of my visit note below.    Patient was here for an injection today.        Again, thank you for allowing me to participate in the care of your patient.        Sincerely,        Hipolito Fischer, DO

## 2021-11-15 NOTE — PATIENT INSTRUCTIONS
DISCHARGE INSTRUCTIONS    During office hours (8:00 a.m.- 4:00 p.m.) questions or concerns may be answered  by calling [facility] Navigation Nurses at  [phone].  Messages received after hours will be returned the following business day.      In the case of an emergency, please dial 911 or seek assistance at the nearest Emergency Room/Urgent Care facility.     All Patients:  ? You may experience an increase in your symptoms for the first 2 days (It may take anywhere between 2 days- 2 weeks for the steroid to have maximum effect).    ? You may use ice on the injection site, as frequently as 20 minutes each hour if needed.    ? You may take your pain medicine.    ? You may continue taking your regular medication.    ? You may shower. No swimming, tub bath or hot tub for 48 hours.  You may remove your bandaid/bandage as soon as you are home.    ? You may resume light activities, as tolerated.    ? Resume your usual diet as tolerated.    ? It is strongly advised that you do not drive for 1-3 hours post injection.    ? If you have had oral sedation:  Do not drive for 8 hours post injection.      ? If you have had IV sedation:  Do not drive for 24 hours post injection.  Do not operate hazardous machinery or make important personal/business decisions for 24 hours.    POSSIBLE STEROID SIDE EFFECTS (If steroid/cortisone was used for your procedure)    -If you experience these symptoms, it should only last for a short period      Swelling of the legs                Skin redness (flushing)       Mouth (oral) irritation     Blood sugar (glucose) levels              Sweats                     Mood changes    Headache    Weakened immune system for up to 14 days, which could increase the risk of sharla the COVID-19 virus and/or experiencing more severe symptoms of the disease, if exposed.         POSSIBLE PROCEDURE SIDE EFFECTS    -Call the Spine Center if you are concerned      Increased Pain             Increased  numbness/tingling        Nausea/Vomiting            Bruising/bleeding at site        Redness or swelling                                                Difficulty walking        Weakness            Fever greater than 100.5    *In the event of a severe headache after an epidural steroid injection that is relieved by lying down, please call the [facility] to speak with a clinical staff member*

## 2021-12-01 ENCOUNTER — OFFICE VISIT (OUTPATIENT)
Dept: NEUROSURGERY | Facility: CLINIC | Age: 76
End: 2021-12-01
Payer: COMMERCIAL

## 2021-12-01 VITALS — DIASTOLIC BLOOD PRESSURE: 62 MMHG | HEART RATE: 72 BPM | SYSTOLIC BLOOD PRESSURE: 110 MMHG | RESPIRATION RATE: 18 BRPM

## 2021-12-01 DIAGNOSIS — M54.12 CERVICAL RADICULOPATHY: Primary | ICD-10-CM

## 2021-12-01 DIAGNOSIS — Z98.1 S/P CERVICAL SPINAL FUSION: ICD-10-CM

## 2021-12-01 PROCEDURE — 99213 OFFICE O/P EST LOW 20 MIN: CPT | Performed by: NURSE PRACTITIONER

## 2021-12-01 NOTE — PROGRESS NOTES
Neurosurgery progress note    A/P:  Be is a 77yo M who is S/P removal of prior C5-6 plate and C6-7 ACDF with Dr Layton 7/26/2021.    Be reports 0.5/10 left arm pain, sometimes pain goes 1.5/10. 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 No neck pain, arm numbness, imbalance, or leg symptoms. He feels his greater priority at this point is his depression. Feels like when his depression is worse his pain is better. His neuro exam is stable at this time. Upon Dr Layton's review of his recent cervical XR, she has concern about potential pseudoarthrosis. Dr Layton called pt post visit to explain rationale for ordering CT. Will plan to follow up with him to review imaging after complete.      Plan:  1. CT cervical spine without contrast  2. F/u with Dr Layton for imaging review telephone visit any day of the week (per Dr Layton)      Exam:  General seated in NAD appears comfortable.    Strength is full throughout both upper and lower extremities  Sensation of upper and lower extremities is intact throughout    Incision well healed    Gait is smooth and coordinated    Imaging:  Personally reviewed prior cervical XR and MRI. Also reviewed by Dr Layton  EXAM: XR CERVICAL SPINE 2/3 VWS  LOCATION: [facility]  DATE/TIME: 11/3/2021 12:11 PM     INDICATION: Followup cervical spine fusion.  COMPARISON: 9/8/2021.  TECHNIQUE: CR Cervical Spine.                                                                      IMPRESSION:   Straightening of the usual cervical lordosis without subluxation. Solid-appearing interbody fusion at C5-C6. Anterior cervical discectomy and fusion hardware at C6-C7 appears unchanged.         EXAM: MR CERVICAL SPINE W/O and W CONTRAST  LOCATION: [facility]  DATE/TIME: 11/3/2021 7:12 PM     INDICATION: Cervical  radiculopathy.  COMPARISON: Cervical spine radiograph 11/3/2021, cervical spine MRI 6/24/2021, neck CT 6/24/2021  CONTRAST: 8 mL Gadavist.  TECHNIQUE: MRI Cervical Spine without and with IV contrast.     FINDINGS:   Straightening of the usual cervical lordosis with minimal retrolisthesis of C6 on C7. Solid-appearing interbody fusion at C5-C6. Artifact related to anterior fusion hardware at C6-C7. Normal spinal cord signal. Mild edema on the left affecting the C6-C7   endplates. No pathologic cervical spine enhancement. Partially imaged soft tissue mass in the deep left parotid space as demonstrated on the comparison neck CT.     C2-C3: Normal disc height. No herniation. Minor facet arthropathy. No stenosis.      C3-C4: Normal disc height. Right foraminal disc osteophyte complex. Mild to moderate facet arthropathy. No central stenosis. Moderate right and mild left foraminal narrowing unchanged.      C4-C5: Normal disc height. Minimal annular bulging. Mild facet arthropathy. No stenosis.      C5-C6: Postoperative changes. Unremarkable facets. No stenosis.      C6-C7: Interspace partially obscured by hardware related artifact. Small right and moderate left foraminal disc herniations. The left-sided disc herniation is slightly diminished versus prior. Unremarkable facets. Adequate central canal. Moderate left   and mild right foraminal narrowing.      C7-T1: Normal disc height. No herniation. Mild to moderate left and mild right facet arthropathy. No stenosis.                                                                      IMPRESSION:  1.  At C3-C4 moderate right foraminal narrowing is unchanged.  2.  At C6-C7 a left foraminal disc herniation is slightly diminished although there is moderate persistent foraminal narrowing.  3.  Up to mild foraminal narrowing elsewhere.  4.  Adequate central canal.  5.  Changes of anterior fusion at C5-C6 and C6-C7.  6.  Left parotid space mass better visualized on the comparison  neck CT.      Thania Kohli FNP-C  [facility]  O. [phone]

## 2021-12-01 NOTE — LETTER
12/1/2021         RE: Shant Muñoz  [address]  [place] [address]        Dear Colleague,    Thank you for referring your patient, Shant Muñoz, to the [facility]. Please see a copy of my visit note below.    Neurosurgery progress note    A/P:  Be is a 75yo M who is S/P removal of prior C5-6 plate and C6-7 ACDF with Dr Layton 7/26/2021.    eB reports 0.5/10 left arm pain, sometimes pain goes 1.5/10. 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 No neck pain, arm numbness, imbalance, or leg symptoms. He feels his greater priority at this point is his depression. Feels like when his depression is worse his pain is better. His neuro exam is stable at this time. Upon Dr Layton's review of his recent cervical XR, she has concern about potential pseudoarthrosis. Dr Layton called pt post visit to explain rationale for ordering CT. Will plan to follow up with him to review imaging after complete.      Plan:  1. CT cervical spine without contrast  2. F/u with Dr Layton for imaging review telephone visit any day of the week (per Dr Layton)      Exam:  General seated in NAD appears comfortable.    Strength is full throughout both upper and lower extremities  Sensation of upper and lower extremities is intact throughout    Incision well healed    Gait is smooth and coordinated    Imaging:  Personally reviewed prior cervical XR and MRI. Also reviewed by Dr Layotn  EXAM: XR CERVICAL SPINE 2/3 VWS  LOCATION: [facility]  DATE/TIME: 11/3/2021 12:11 PM     INDICATION: Followup cervical spine fusion.  COMPARISON: 9/8/2021.  TECHNIQUE: CR Cervical Spine.                                                                      IMPRESSION:   Straightening of the usual cervical lordosis without subluxation.  Solid-appearing interbody fusion at C5-C6. Anterior cervical discectomy and fusion hardware at C6-C7 appears unchanged.         EXAM: MR CERVICAL SPINE W/O and W CONTRAST  LOCATION: [facility]  DATE/TIME: 11/3/2021 7:12 PM     INDICATION: Cervical radiculopathy.  COMPARISON: Cervical spine radiograph 11/3/2021, cervical spine MRI 6/24/2021, neck CT 6/24/2021  CONTRAST: 8 mL Gadavist.  TECHNIQUE: MRI Cervical Spine without and with IV contrast.     FINDINGS:   Straightening of the usual cervical lordosis with minimal retrolisthesis of C6 on C7. Solid-appearing interbody fusion at C5-C6. Artifact related to anterior fusion hardware at C6-C7. Normal spinal cord signal. Mild edema on the left affecting the C6-C7   endplates. No pathologic cervical spine enhancement. Partially imaged soft tissue mass in the deep left parotid space as demonstrated on the comparison neck CT.     C2-C3: Normal disc height. No herniation. Minor facet arthropathy. No stenosis.      C3-C4: Normal disc height. Right foraminal disc osteophyte complex. Mild to moderate facet arthropathy. No central stenosis. Moderate right and mild left foraminal narrowing unchanged.      C4-C5: Normal disc height. Minimal annular bulging. Mild facet arthropathy. No stenosis.      C5-C6: Postoperative changes. Unremarkable facets. No stenosis.      C6-C7: Interspace partially obscured by hardware related artifact. Small right and moderate left foraminal disc herniations. The left-sided disc herniation is slightly diminished versus prior. Unremarkable facets. Adequate central canal. Moderate left   and mild right foraminal narrowing.      C7-T1: Normal disc height. No herniation. Mild to moderate left and mild right facet arthropathy. No stenosis.                                                                      IMPRESSION:  1.  At C3-C4 moderate right foraminal narrowing is unchanged.  2.  At C6-C7 a left foraminal disc herniation is  slightly diminished although there is moderate persistent foraminal narrowing.  3.  Up to mild foraminal narrowing elsewhere.  4.  Adequate central canal.  5.  Changes of anterior fusion at C5-C6 and C6-C7.  6.  Left parotid space mass better visualized on the comparison neck CT.      Thania Kohli FNP-C  [facility]  O. [phone]          Again, thank you for allowing me to participate in the care of your patient.        Sincerely,        RAUL Crook CNP

## 2021-12-09 ENCOUNTER — HOSPITAL ENCOUNTER (OUTPATIENT)
Dept: CT IMAGING | Facility: HOSPITAL | Age: 76
Discharge: HOME | End: 2021-12-09
Attending: SURGERY | Admitting: SURGERY
Payer: COMMERCIAL

## 2021-12-09 DIAGNOSIS — M54.12 CERVICAL RADICULOPATHY: ICD-10-CM

## 2021-12-09 DIAGNOSIS — Z98.1 S/P CERVICAL SPINAL FUSION: ICD-10-CM

## 2021-12-09 PROCEDURE — 72125 CT NECK SPINE W/O DYE: CPT

## 2021-12-10 ENCOUNTER — VIRTUAL VISIT (OUTPATIENT)
Dept: NEUROSURGERY | Facility: CLINIC | Age: 76
End: 2021-12-10
Payer: COMMERCIAL

## 2021-12-10 DIAGNOSIS — M54.12 CERVICAL RADICULOPATHY: Primary | ICD-10-CM

## 2021-12-10 DIAGNOSIS — R45.89 THOUGHTS OF SELF HARM: Primary | ICD-10-CM

## 2021-12-10 PROCEDURE — 99213 OFFICE O/P EST LOW 20 MIN: CPT | Performed by: SURGERY

## 2021-12-10 NOTE — PROGRESS NOTES
77 yo male who is s/p removal of C5-6 plate and C6-7 ACDF on 7/26/21. Initially did well following the surgery with recurrence of left arm radiculopathy. Discussed findings of recent CT. CT shows subsidence of his C6-7 interbody implant with mild to moderate left foraminal narrowing. Discussed risks and benefits left cervical 5-6 posterior foraminotomy and posterior instrumented fusion.     He also expressed that he is feeling depressed. Having thoughts of suicidal ideation without plan. Saw primary last week who increased selective serotonin reuptake inhibitor and ordered psychiatry referral. Will try to prioritize his referral to urgent.      Nataly Layton MD

## 2021-12-10 NOTE — LETTER
12/10/2021         RE: Shant Muñoz  [address]  [facility] [address]        Dear Colleague,    Thank you for referring your patient, Shant Muñoz, to the [facility]. Please see a copy of my visit note below.    77 yo male who is s/p removal of C5-6 plate and C6-7 ACDF on 7/26/21. Initially did well following the surgery with recurrence of left arm radiculopathy. Discussed findings of recent CT. CT shows subsidence of his C6-7 interbody implant with mild to moderate left foraminal narrowing. Discussed risks and benefits left cervical 5-6 posterior foraminotomy and posterior instrumented fusion.     He also expressed that he is feeling depressed. Having thoughts of suicidal ideation without plan. Saw primary last week who increased selective serotonin reuptake inhibitor and ordered psychiatry referral. Will try to prioritize his referral to urgent.      Nataly Layton MD      Again, thank you for allowing me to participate in the care of your patient.        Sincerely,        Nataly Layton MD

## 2021-12-10 NOTE — PROGRESS NOTES
Orders placed for an emergent psych eval to be completed in 1-2 business days per Dr. Layton.     Holly Barnes RN

## 2021-12-12 ENCOUNTER — HEALTH MAINTENANCE LETTER (OUTPATIENT)
Age: 76
End: 2021-12-12

## 2022-01-21 NOTE — ADDENDUM NOTE
Encounter addended by: Polly Da Silva, PT on: 1/21/2022 5:17 PM   Actions taken: Clinical Note Signed, Episode resolved

## 2022-03-03 ENCOUNTER — TELEPHONE (OUTPATIENT)
Dept: NEUROSURGERY | Facility: CLINIC | Age: 77
End: 2022-03-03
Payer: COMMERCIAL

## 2022-03-03 NOTE — TELEPHONE ENCOUNTER
Called patient and asked if he wanted to proceed with surgery. Patient is still dealing with depression and wants to hold off on scheduling. Patient stated that he will call back when/if he wants to have the surgery done and feels up to it.

## 2022-04-03 ENCOUNTER — MYC MEDICAL ADVICE (OUTPATIENT)
Dept: NEUROSURGERY | Facility: CLINIC | Age: 77
End: 2022-04-03
Payer: COMMERCIAL

## 2022-04-03 ENCOUNTER — HEALTH MAINTENANCE LETTER (OUTPATIENT)
Age: 77
End: 2022-04-03

## 2022-05-19 ENCOUNTER — OFFICE VISIT (OUTPATIENT)
Dept: NEUROSURGERY | Facility: CLINIC | Age: 77
End: 2022-05-19
Payer: COMMERCIAL

## 2022-05-19 ENCOUNTER — OFFICE VISIT (OUTPATIENT)
Dept: PHYSICAL MEDICINE AND REHAB | Facility: CLINIC | Age: 77
End: 2022-05-19
Payer: COMMERCIAL

## 2022-05-19 VITALS — SYSTOLIC BLOOD PRESSURE: 141 MMHG | DIASTOLIC BLOOD PRESSURE: 93 MMHG

## 2022-05-19 VITALS
HEIGHT: 72 IN | BODY MASS INDEX: 25.6 KG/M2 | OXYGEN SATURATION: 93 % | SYSTOLIC BLOOD PRESSURE: 141 MMHG | DIASTOLIC BLOOD PRESSURE: 93 MMHG | WEIGHT: 189 LBS | HEART RATE: 69 BPM

## 2022-05-19 DIAGNOSIS — Z98.1 HISTORY OF FUSION OF CERVICAL SPINE: ICD-10-CM

## 2022-05-19 DIAGNOSIS — M54.12 CERVICAL RADICULOPATHY: Primary | ICD-10-CM

## 2022-05-19 DIAGNOSIS — M54.12 CERVICAL RADICULAR PAIN: Primary | ICD-10-CM

## 2022-05-19 DIAGNOSIS — M48.02 FORAMINAL STENOSIS OF CERVICAL REGION: ICD-10-CM

## 2022-05-19 DIAGNOSIS — M47.812 ARTHROPATHY OF CERVICAL FACET JOINT: ICD-10-CM

## 2022-05-19 PROCEDURE — 99213 OFFICE O/P EST LOW 20 MIN: CPT | Performed by: SURGERY

## 2022-05-19 PROCEDURE — 99214 OFFICE O/P EST MOD 30 MIN: CPT | Performed by: PHYSICAL MEDICINE & REHABILITATION

## 2022-05-19 ASSESSMENT — PAIN SCALES - GENERAL: PAINLEVEL: MILD PAIN (3)

## 2022-05-19 NOTE — PATIENT INSTRUCTIONS
A Left cervical epidural injection on the C7 nerve has been ordered today. Please schedule this injection at least 2 weeks from now to allow time for insurance prior authorization. On the day of your injection, you cannot be sick or taking antibiotics. If you become sick and are prescribed, please call the clinic so your injection can be rescheduled for once you have completed your antibiotics. You will need to bring a  with you for your injection. If you have any questions or concerns prior to your injection, please do not hesitate to call the nurse navigation line at [phone].

## 2022-05-19 NOTE — PROGRESS NOTES
Assessment/Plan:      Shant was seen today for shoulder pain.    Diagnoses and all orders for this visit:    Cervical radicular pain  -     PAIN Transforaminal DAIANA Inj Cervical Left; Future    Foraminal stenosis of cervical region  -     PAIN Transforaminal DAIANA Inj Cervical Left; Future    Arthropathy of cervical facet joint    History of fusion of cervical spine         Assessment: Pleasant 76 year old male with past medical history significant for hyperlipidemia, hypertension, orthostatic hypotension, aortic stenosis, type 2 diabetes mellitus, acute kidney injury, depression, insomnia, status post removal of C5-6 plate and C6-7 ACDF July 26, 2021 with:     1.  Significant waxing and waning left cervical radicular pain versus facet arthropathy and myofascial pain.  This is in the left parascapular region down the arm to the elbow.  Consistent with proximal C7 radicular symptoms as he has at least moderate left C6/7 foraminal stenosis and he also has left-sided C7-T1 facet arthropathy which could be causing his symptoms.    2.  Myofascial pain parascapular region upper trapezius relatively mild.      Discussion:    1.  We discussed the diagnosis and treatment options.  The most significant issue is that this pain triggers depression for him and its a quality-of-life issue.  We discussed that although his pain is mostly in the parascapular region and arm, he has full range of motion of the left shoulder and I am unable to reproduce his symptoms with shoulder provocative maneuvers and this is likely related to radicular pain versus facet arthropathy.  We discussed options of further physical therapy for which she has had no benefit, as well as cervical injections such as epidural versus medial branch blocks.  We also discussed the option of left upper extremity EMG however given normal strength and reflexes on exam I suspect this would be a normal study and will for this in the future if he has persistent  symptoms.    2.  Trial a left C6/7 transforaminal epidural steroid injection.  This can be with Dr. Melo.    3.  If this is not helpful can consider left C7 and T1 medial branch blocks    4.  Follow-up with [facility] 2 to 4 weeks after injection.       It was our pleasure caring for your patient today, if there any questions or concerns please do not hesitate to contact us.      Subjective:   Patient ID: Shant Muñoz is a 76 year old male.    History of Present Illness: Patient presents at the request of Dr. Layton for reevaluation of left-sided arm pain and shoulder pain.  Patient has a history of C5-6 fusion with a redo/removal of hardware and extension of the fusion to C6-7 last July.  He has had persistent waxing waning left shoulder parascapular pain since.  The neck is doing fairly well but the pain does start in the base of the cervical spine left upper trapezius parascapular region down the left arm bicep and tricep to the elbow.  No paresthesias but he feels weakness in the arm.  The pain seems to wax and wane but there is a component of constant pain.  The more significant issue is that this pain triggers of depression for him.  When his depression is at its worst the pain is better when the pain is at its best the depression is worse than its quality of life issue.  No paresthesias down the arm.  Unsure what makes his pain worse other than change in depression unsure what makes it better when it occurs.  He has had a cervical interlaminar epidural steroid injection without benefit and he has had physical therapy which she states did not help.  Pain rating today is a 3/10.  He is under the care of a psychiatrist and PCP and is on couple of medications.  He was seen by Dr. Layton this morning who recommended conservative management and referred back to the spine center.    Imaging: MRI report and images were personally reviewed and discussed with the patient.  A plastic model was utilized during the  discussion.  MRI and CT of the cervical spine personally reviewed.Reveals the C5-6 and 6-7 ACDF with interbody fusion solid at C5-6 there is no evidence of loosening of the C6/7 hardware.  At C6-7 on the left there is at least moderate left foraminal stenosis.  On my review there is also left C7-T1 facet arthropathy.  The facet arthropathy is felt to be moderate on the left at C7-T1 on MRI.  Also least moderate foraminal stenosis at C6/7 on MRI.    Review of Systems: Pertinent positives:  Gets headaches, feels weakness balance.  Pertinent negatives: No numbness or tingling.  No bowel or bladder incontinence.  No urinary retention.  No fevers, unintentional weight loss,  difficulty swallowing, or coordination difficulties.  All others reviewed are negative.         Past Medical History:   Diagnosis Date     Diabetes (H)      Hypertension      Syncope, unspecified syncope type        The following portions of the patient's history were reviewed and updated as appropriate: allergies, current medications, past family history, past medical history, past social history, past surgical history and problem list.           Objective:   Physical Exam:    BP (!) 141/93   There is no height or weight on file to calculate BMI.      General: Alert and oriented with normal affect. Attention, knowledge, memory, and language are intact. No acute distress.   Eyes: Sclerae are clear.  Respirations: Unlabored.    Gait:  Nonantalgic  Mildly reduced range of motion cervical spine all planes.  Full range of motion of left shoulder Abduction internal and external rotation with negative arm drop a can and speeds test on the left negative Arauz and Neer's on the left.  Sensation is intact to light touch throughout the upper   extremities.  Reflexes are 2+ and symmetric in the biceps triceps and brachioradialis with negative Hoffmans.  Tenderness palpation over the left cervical thoracic junction.  Mild positive Draper to the left.    Manual  muscle testing reveals:  Right /Left out of 5  5/5 shoulder abductors  5/5 elbow flexors  5/5 elbow extensors  5/5 wrist extensors  5/5 interosseus  5/5 finger flexors

## 2022-05-19 NOTE — PROGRESS NOTES
NEUROSURGERY FOLLOWUP  NOTE    Shant Muñoz comes today in f/u.  Patient is a 76-year-old male who returns in follow-up.  Patient states that he intermittently gets left shoulder pain down to his tricep.  Not extend past his elbow.  He has very little neck pain.  He notes that this pain seems to trigger his depression.  His depression gets better so does his shoulder pain.  He is very frustrated by the onset of his left shoulder pain again.  Feels kind of helpless about it.  Recently was seen by his primary care physician and he is on Prozac and it was increased yesterday.  He has done physical therapy in the past without any significant relief.    PHYSICAL EXAM:   Constitutional: BP (!) 141/93   Pulse 69   Ht 6' (1.829 m)   Wt 189 lb (85.7 kg)   SpO2 93%   BMI 25.63 kg/m       Mental Status: A & O in no acute distress.  Affect is appropriate.  Speech is fluent.  Recent and remote memory are intact.  Attention span and concentration are normal.     Motor:  Normal bulk and tone all muscle groups of upper and lower extremities.     Sensory: Sensation intact bilaterally to light touch.      Reflexes; supinator, biceps, triceps, knee/ ankle jerk intact.     IMAGING:   I personally reviewed all radiographic images        CONSULTATION ASSESSMENT AND PLAN:    Does have residual left moderate cervical 6-7 foraminal narrowing due to subsidence of his graft on past imaging.  Although his pain ends at his elbow may be due to a C7 radiculopathy. Does not appear to have significant C5 stenosis on the left.  He has full range of the shoulder and does not appear to have shoulder pathology.  He hopes to avoid any further surgery. recommended a consultation with our spine medicine colleagues for an additional opinions.  Consider transforaminal epidural steroid injections. We will see if they have availability today.     I spent more than 20 minutes in this apt, examining the pt, reviewing the scans, reviewing notes from  chart, discussing treatment options with risks and benefits and coordinating care.     Nataly Layton MD      CC:     [facility]  [facility] [address] [place] [address]

## 2022-05-19 NOTE — NURSING NOTE
Pt is here to discuss surgery further. Pt states that his pain gets a little bit better when he is depressed and pain increases when he is not depressed and the pain is in his neck and left shoulder stop at the elbow. Pt also has balance issue.   NDI: 28%  Alfredito Mera MA

## 2022-05-19 NOTE — LETTER
5/19/2022         RE: Shant Muñoz  [address]  [facility] [address]        Dear Colleague,    Thank you for referring your patient, Shant Muñoz, to the [facility] SPINE AND [facility]UR[facility]URGERY. Please see a copy of my visit note below.    NEUROSURGERY FOLLOWUP  NOTE    Shant Muñoz comes today in f/u.  Patient is a 76-year-old male who returns in follow-up.  Patient states that he intermittently gets left shoulder pain down to his tricep.  Not extend past his elbow.  He has very little neck pain.  He notes that this pain seems to trigger his depression.  His depression gets better so does his shoulder pain.  He is very frustrated by the onset of his left shoulder pain again.  Feels kind of helpless about it.  Recently was seen by his primary care physician and he is on Prozac and it was increased yesterday.  He has done physical therapy in the past without any significant relief.    PHYSICAL EXAM:   Constitutional: BP (!) 141/93   Pulse 69   Ht 6' (1.829 m)   Wt 189 lb (85.7 kg)   SpO2 93%   BMI 25.63 kg/m       Mental Status: A & O in no acute distress.  Affect is appropriate.  Speech is fluent.  Recent and remote memory are intact.  Attention span and concentration are normal.     Motor:  Normal bulk and tone all muscle groups of upper and lower extremities.     Sensory: Sensation intact bilaterally to light touch.      Reflexes; supinator, biceps, triceps, knee/ ankle jerk intact.     IMAGING:   I personally reviewed all radiographic images        CONSULTATION ASSESSMENT AND PLAN:    Does have residual left moderate cervical 6-7 foraminal narrowing due to subsidence of his graft on past imaging.  Although his pain ends at his elbow may be due to a C7 radiculopathy. Does not appear to have significant C5 stenosis on the left.  He has full range of the shoulder and does not appear to have shoulder pathology.  He hopes to avoid any further surgery. recommended a consultation with our  spine medicine colleagues for an additional opinions.  Consider transforaminal epidural steroid injections. We will see if they have availability today.     I spent more than 20 minutes in this apt, examining the pt, reviewing the scans, reviewing notes from chart, discussing treatment options with risks and benefits and coordinating care.     Nataly Layton MD      CC:     Kera Barroso  [address]  [place] [address]        Again, thank you for allowing me to participate in the care of your patient.        Sincerely,        Nataly Layton MD

## 2022-05-19 NOTE — LETTER
5/19/2022         RE: Shant Muñoz  [address]  [facility] [address]        Dear Colleague,    Thank you for referring your patient, Shant Muñoz, to the [facility] SPINE AND NEUROSURGERY. Please see a copy of my visit note below.    Assessment/Plan:      Shant was seen today for shoulder pain.    Diagnoses and all orders for this visit:    Cervical radicular pain  -     PAIN Transforaminal DAIANA Inj Cervical Left; Future    Foraminal stenosis of cervical region  -     PAIN Transforaminal DAIANA Inj Cervical Left; Future    Arthropathy of cervical facet joint    History of fusion of cervical spine         Assessment: Pleasant 76 year old male with past medical history significant for hyperlipidemia, hypertension, orthostatic hypotension, aortic stenosis, type 2 diabetes mellitus, acute kidney injury, depression, insomnia, status post removal of C5-6 plate and C6-7 ACDF July 26, 2021 with:     1.  Significant waxing and waning left cervical radicular pain versus facet arthropathy and myofascial pain.  This is in the left parascapular region down the arm to the elbow.  Consistent with proximal C7 radicular symptoms as he has at least moderate left C6/7 foraminal stenosis and he also has left-sided C7-T1 facet arthropathy which could be causing his symptoms.    2.  Myofascial pain parascapular region upper trapezius relatively mild.      Discussion:    1.  We discussed the diagnosis and treatment options.  The most significant issue is that this pain triggers depression for him and its a quality-of-life issue.  We discussed that although his pain is mostly in the parascapular region and arm, he has full range of motion of the left shoulder and I am unable to reproduce his symptoms with shoulder provocative maneuvers and this is likely related to radicular pain versus facet arthropathy.  We discussed options of further physical therapy for which she has had no benefit, as well as cervical  injections such as epidural versus medial branch blocks.  We also discussed the option of left upper extremity EMG however given normal strength and reflexes on exam I suspect this would be a normal study and will for this in the future if he has persistent symptoms.    2.  Trial a left C6/7 transforaminal epidural steroid injection.  This can be with Dr. Melo.    3.  If this is not helpful can consider left C7 and T1 medial branch blocks    4.  Follow-up with [facility] 2 to 4 weeks after injection.       It was our pleasure caring for your patient today, if there any questions or concerns please do not hesitate to contact us.      Subjective:   Patient ID: Shant Muñoz is a 76 year old male.    History of Present Illness: Patient presents at the request of Dr. Layton for reevaluation of left-sided arm pain and shoulder pain.  Patient has a history of C5-6 fusion with a redo/removal of hardware and extension of the fusion to C6-7 last July.  He has had persistent waxing waning left shoulder parascapular pain since.  The neck is doing fairly well but the pain does start in the base of the cervical spine left upper trapezius parascapular region down the left arm bicep and tricep to the elbow.  No paresthesias but he feels weakness in the arm.  The pain seems to wax and wane but there is a component of constant pain.  The more significant issue is that this pain triggers of depression for him.  When his depression is at its worst the pain is better when the pain is at its best the depression is worse than its quality of life issue.  No paresthesias down the arm.  Unsure what makes his pain worse other than change in depression unsure what makes it better when it occurs.  He has had a cervical interlaminar epidural steroid injection without benefit and he has had physical therapy which she states did not help.  Pain rating today is a 3/10.  He is under the care of a psychiatrist and PCP and is on couple of  medications.  He was seen by Dr. Layton this morning who recommended conservative management and referred back to the spine center.    Imaging: MRI report and images were personally reviewed and discussed with the patient.  A plastic model was utilized during the discussion.  MRI and CT of the cervical spine personally reviewed.Reveals the C5-6 and 6-7 ACDF with interbody fusion solid at C5-6 there is no evidence of loosening of the C6/7 hardware.  At C6-7 on the left there is at least moderate left foraminal stenosis.  On my review there is also left C7-T1 facet arthropathy.  The facet arthropathy is felt to be moderate on the left at C7-T1 on MRI.  Also least moderate foraminal stenosis at C6/7 on MRI.    Review of Systems: Pertinent positives:  Gets headaches, feels weakness balance.  Pertinent negatives: No numbness or tingling.  No bowel or bladder incontinence.  No urinary retention.  No fevers, unintentional weight loss,  difficulty swallowing, or coordination difficulties.  All others reviewed are negative.         Past Medical History:   Diagnosis Date     Diabetes (H)      Hypertension      Syncope, unspecified syncope type        The following portions of the patient's history were reviewed and updated as appropriate: allergies, current medications, past family history, past medical history, past social history, past surgical history and problem list.           Objective:   Physical Exam:    BP (!) 141/93   There is no height or weight on file to calculate BMI.      General: Alert and oriented with normal affect. Attention, knowledge, memory, and language are intact. No acute distress.   Eyes: Sclerae are clear.  Respirations: Unlabored.    Gait:  Nonantalgic  Mildly reduced range of motion cervical spine all planes.  Full range of motion of left shoulder Abduction internal and external rotation with negative arm drop a can and speeds test on the left negative Arauz and Neer's on the left.  Sensation  is intact to light touch throughout the upper   extremities.  Reflexes are 2+ and symmetric in the biceps triceps and brachioradialis with negative Hoffmans.  Tenderness palpation over the left cervical thoracic junction.  Mild positive Draper to the left.    Manual muscle testing reveals:  Right /Left out of 5  5/5 shoulder abductors  5/5 elbow flexors  5/5 elbow extensors  5/5 wrist extensors  5/5 interosseus  5/5 finger flexors         Again, thank you for allowing me to participate in the care of your patient.        Sincerely,        Alexis Bedoya, DO

## 2022-06-02 ENCOUNTER — RADIOLOGY INJECTION OFFICE VISIT (OUTPATIENT)
Dept: PHYSICAL MEDICINE AND REHAB | Facility: CLINIC | Age: 77
End: 2022-06-02
Attending: PHYSICAL MEDICINE & REHABILITATION
Payer: COMMERCIAL

## 2022-06-02 VITALS
RESPIRATION RATE: 18 BRPM | OXYGEN SATURATION: 97 % | SYSTOLIC BLOOD PRESSURE: 142 MMHG | TEMPERATURE: 97.5 F | HEART RATE: 62 BPM | DIASTOLIC BLOOD PRESSURE: 82 MMHG

## 2022-06-02 DIAGNOSIS — M54.12 CERVICAL RADICULAR PAIN: ICD-10-CM

## 2022-06-02 DIAGNOSIS — M48.02 FORAMINAL STENOSIS OF CERVICAL REGION: ICD-10-CM

## 2022-06-02 PROCEDURE — 64479 NJX AA&/STRD TFRM EPI C/T 1: CPT | Mod: LT | Performed by: PHYSICAL MEDICINE & REHABILITATION

## 2022-06-02 RX ORDER — DEXAMETHASONE SODIUM PHOSPHATE 10 MG/ML
INJECTION, SOLUTION INTRAMUSCULAR; INTRAVENOUS
Status: COMPLETED | OUTPATIENT
Start: 2022-06-02 | End: 2022-06-02

## 2022-06-02 RX ORDER — ARIPIPRAZOLE 2 MG/1
4 TABLET ORAL DAILY
COMMUNITY
Start: 2022-05-18 | End: 2024-03-05

## 2022-06-02 RX ORDER — LIDOCAINE HYDROCHLORIDE 10 MG/ML
INJECTION, SOLUTION EPIDURAL; INFILTRATION; INTRACAUDAL; PERINEURAL
Status: COMPLETED | OUTPATIENT
Start: 2022-06-02 | End: 2022-06-02

## 2022-06-02 RX ADMIN — DEXAMETHASONE SODIUM PHOSPHATE 10 MG: 10 INJECTION, SOLUTION INTRAMUSCULAR; INTRAVENOUS at 09:59

## 2022-06-02 RX ADMIN — LIDOCAINE HYDROCHLORIDE 2 ML: 10 INJECTION, SOLUTION EPIDURAL; INFILTRATION; INTRACAUDAL; PERINEURAL at 09:58

## 2022-06-02 ASSESSMENT — PAIN SCALES - GENERAL
PAINLEVEL: MILD PAIN (2)
PAINLEVEL: NO PAIN (1)

## 2022-06-02 NOTE — PATIENT INSTRUCTIONS
Follow-up visit with Dr. Bedoya in 2 weeks to discuss injection outcome and determine care plan going forward.    Please be advised that your blood glucose levels may be increased for the next 5-7 days as a result of the steroid you received with your injection today.  It is recommended that you monitor your blood glucose levels closely over the next week and direct any additional questions regarding your blood glucose management to your primary doctor.       DISCHARGE INSTRUCTIONS    During office hours (8:00 a.m.- 4:00 p.m.) questions or concerns may be answered  by calling Spine Center Navigation Nurses at  [phone].  Messages received after hours will be returned the following business day.      In the case of an emergency, please dial 911 or seek assistance at the nearest Emergency Room/Urgent Care facility.     All Patients:    You may experience an increase in your symptoms for the first 2 days (It may take anywhere between 2 days- 2 weeks for the steroid to have maximum effect).    You may use ice on the injection site, as frequently as 20 minutes each hour if needed.    You may take your pain medicine.    You may continue taking your regular medication after your injection. If you have had a Medial Branch Block you may resume pain medication once your pain diary is completed.    You may shower. No swimming, tub bath or hot tub for 48 hours.  You may remove your bandaid/bandage as soon as you are home.    You may resume light activities, as tolerated.    Resume your usual diet as tolerated.    It is strongly advised that you do not drive for 1-3 hours post injection.    If you have had oral sedation:  Do not drive for 8 hours post injection.      If you have had IV sedation:  Do not drive for 24 hours post injection.  Do not operate hazardous machinery or make important personal/business decisions for 24 hours.      POSSIBLE STEROID SIDE EFFECTS (If steroid/cortisone was used for your procedure)    -If  you experience these symptoms, it should only last for a short period    Swelling of the legs              Skin redness (flushing)     Mouth (oral) irritation   Blood sugar (glucose) levels            Sweats                    Mood changes  Headache  Sleeplessness  Weakened immune system for up to 14 days, which could increase the risk of sharla the COVID-19 virus and/or experiencing more severe symptoms of the disease, if exposed.  Decreased effectiveness of the flu vaccine if given within 2 weeks of the steroid.         POSSIBLE PROCEDURE SIDE EFFECTS  -Call the Spine Center if you are concerned  Increased Pain           Increased numbness/tingling      Nausea/Vomiting          Bruising/bleeding at site      Redness or swelling                                              Difficulty walking      Weakness           Fever greater than 100.5    *In the event of a severe headache after an epidural steroid injection that is relieved by lying down, please call the [facility] to speak with a clinical staff member*

## 2022-06-06 ENCOUNTER — HOSPITAL ENCOUNTER (EMERGENCY)
Facility: HOSPITAL | Age: 77
Discharge: HOME | End: 2022-06-06
Attending: EMERGENCY MEDICINE | Admitting: EMERGENCY MEDICINE
Payer: COMMERCIAL

## 2022-06-06 VITALS
OXYGEN SATURATION: 96 % | SYSTOLIC BLOOD PRESSURE: 131 MMHG | RESPIRATION RATE: 18 BRPM | TEMPERATURE: 97.7 F | WEIGHT: 190 LBS | BODY MASS INDEX: 25.73 KG/M2 | HEART RATE: 69 BPM | DIASTOLIC BLOOD PRESSURE: 75 MMHG | HEIGHT: 72 IN

## 2022-06-06 DIAGNOSIS — F32.A DEPRESSION, UNSPECIFIED DEPRESSION TYPE: ICD-10-CM

## 2022-06-06 LAB
ANION GAP SERPL CALCULATED.3IONS-SCNC: 11 MMOL/L (ref 5–18)
APAP SERPL-MCNC: <3 UG/ML (ref 10–20)
BASOPHILS # BLD AUTO: 0 10E3/UL (ref 0–0.2)
BASOPHILS NFR BLD AUTO: 1 %
BUN SERPL-MCNC: 24 MG/DL (ref 8–28)
CALCIUM SERPL-MCNC: 9.1 MG/DL (ref 8.5–10.5)
CHLORIDE BLD-SCNC: 105 MMOL/L (ref 98–107)
CO2 SERPL-SCNC: 25 MMOL/L (ref 22–31)
CREAT SERPL-MCNC: 1.19 MG/DL (ref 0.7–1.3)
EOSINOPHIL # BLD AUTO: 0.1 10E3/UL (ref 0–0.7)
EOSINOPHIL NFR BLD AUTO: 2 %
ERYTHROCYTE [DISTWIDTH] IN BLOOD BY AUTOMATED COUNT: 13.8 % (ref 10–15)
ETHANOL SERPL-MCNC: <10 MG/DL
GFR SERPL CREATININE-BSD FRML MDRD: 63 ML/MIN/1.73M2
GLUCOSE BLD-MCNC: 97 MG/DL (ref 70–125)
HCT VFR BLD AUTO: 44.5 % (ref 40–53)
HGB BLD-MCNC: 14.4 G/DL (ref 13.3–17.7)
IMM GRANULOCYTES # BLD: 0 10E3/UL
IMM GRANULOCYTES NFR BLD: 0 %
LYMPHOCYTES # BLD AUTO: 2.9 10E3/UL (ref 0.8–5.3)
LYMPHOCYTES NFR BLD AUTO: 38 %
MCH RBC QN AUTO: 26.8 PG (ref 26.5–33)
MCHC RBC AUTO-ENTMCNC: 32.4 G/DL (ref 31.5–36.5)
MCV RBC AUTO: 83 FL (ref 78–100)
MONOCYTES # BLD AUTO: 0.8 10E3/UL (ref 0–1.3)
MONOCYTES NFR BLD AUTO: 11 %
NEUTROPHILS # BLD AUTO: 3.8 10E3/UL (ref 1.6–8.3)
NEUTROPHILS NFR BLD AUTO: 48 %
NRBC # BLD AUTO: 0 10E3/UL
NRBC BLD AUTO-RTO: 0 /100
PLATELET # BLD AUTO: 344 10E3/UL (ref 150–450)
POTASSIUM BLD-SCNC: 3.7 MMOL/L (ref 3.5–5)
RBC # BLD AUTO: 5.38 10E6/UL (ref 4.4–5.9)
SALICYLATES SERPL-MCNC: <8 MG/DL (ref 2–25)
SODIUM SERPL-SCNC: 141 MMOL/L (ref 136–145)
TSH SERPL DL<=0.005 MIU/L-ACNC: 1.46 UIU/ML (ref 0.3–5)
WBC # BLD AUTO: 7.7 10E3/UL (ref 4–11)

## 2022-06-06 PROCEDURE — 99283 EMERGENCY DEPT VISIT LOW MDM: CPT

## 2022-06-06 PROCEDURE — 82077 ASSAY SPEC XCP UR&BREATH IA: CPT | Performed by: EMERGENCY MEDICINE

## 2022-06-06 PROCEDURE — 85025 COMPLETE CBC W/AUTO DIFF WBC: CPT | Performed by: EMERGENCY MEDICINE

## 2022-06-06 PROCEDURE — 36415 COLL VENOUS BLD VENIPUNCTURE: CPT | Performed by: EMERGENCY MEDICINE

## 2022-06-06 PROCEDURE — 80179 DRUG ASSAY SALICYLATE: CPT | Performed by: EMERGENCY MEDICINE

## 2022-06-06 PROCEDURE — 80048 BASIC METABOLIC PNL TOTAL CA: CPT | Performed by: EMERGENCY MEDICINE

## 2022-06-06 PROCEDURE — 84443 ASSAY THYROID STIM HORMONE: CPT | Performed by: EMERGENCY MEDICINE

## 2022-06-06 PROCEDURE — 80143 DRUG ASSAY ACETAMINOPHEN: CPT | Performed by: EMERGENCY MEDICINE

## 2022-06-06 ASSESSMENT — ENCOUNTER SYMPTOMS
FATIGUE: 1
ABDOMINAL PAIN: 0

## 2022-06-06 NOTE — ED NOTES
ED Triage Provider Note  [facility]  Encounter Date: Jun 6, 2022    History:  Chief Complaint   Patient presents with     Depression     Shant Muñoz is a 76 year old male who presents to the ED with depression and fatigue worsening over the last couple of weeks.  He did just recently increase his behavioral medications.  No recent injuries or illness.  He denies dorita suicidality..     Review of Systems:  Review of Systems   Constitutional: Positive for fatigue.   Cardiovascular: Negative for chest pain.   Gastrointestinal: Negative for abdominal pain.   Psychiatric/Behavioral: Negative for self-injury.         Exam:  /75   Pulse 69   Temp 97.7  F (36.5  C) (Oral)   Resp 18   Ht 1.829 m (6')   Wt 86.2 kg (190 lb)   SpO2 96%   BMI 25.77 kg/m      Physical Exam  Vitals and nursing note reviewed.   Constitutional:       General: He is not in acute distress.  HENT:      Head: Normocephalic.      Nose: Nose normal.   Eyes:      General: No scleral icterus.  Cardiovascular:      Rate and Rhythm: Normal rate.   Pulmonary:      Effort: Pulmonary effort is normal.   Musculoskeletal:      Cervical back: Neck supple.   Skin:     Findings: No rash.   Neurological:      Mental Status: He is alert. Mental status is at baseline.   Psychiatric:         Mood and Affect: Mood normal.         Medical Decision Making:  Patient arriving to the ED with problem as above. A medical screening exam was performed.  Work-up was initiated from triage.  Will need formal ER evaluation.              Jovanny Kaplan MD on 6/6/2022 at 3:28 PM      Lab/Imaging Results:       Interventions:  Medications - No data to display       Jovanny Kaplan MD  06/06/22 4945

## 2022-06-06 NOTE — ED TRIAGE NOTES
Patient c/o feeling depressed with no energy and that he is feeling more confused than normal.   States he just went through a divorce when these feelings started.   Denies thoughts of harming self. Denies any suicidal attempts in the past.

## 2022-06-07 NOTE — ED PROVIDER NOTES
EMERGENCY DEPARTMENT ENCOUnter      NAME: Shant Muñoz  AGE: 76 year old male  YOB: 1945  MRN: [id number]  EVALUATION DATE & TIME: No admission date for patient encounter.    PCP: Kera Barroso    ED PROVIDER: Garo Charles DO      Chief Complaint   Patient presents with     Depression         FINAL IMPRESSION:  1. Depression, unspecified depression type          ED COURSE & MEDICAL DECISION MAKIN:43 PM I met with the patient for the initial interview and physical examination. Discussed plan for treatment and workup in the ED.  PPE Worn: N95 and gloves      The patient presented emerged from today with concerns about depression.  He denied any suicidal ideation.  He appears well on exam.  He was evaluated by the crisis  who felt that the patient is safe for discharge home.  I agree with this plan.  He is already scheduled to have a follow-up appointment with his psychiatrist.  He has been instructed to return to the ER for any worsening symptoms or other concerns.      At the conclusion of the encounter I discussed the results of all of the tests and the disposition. The questions were answered. The patient or family acknowledged understanding and was agreeable with the care plan.           =================================================================    HPI        Shant Muñoz is a 76 year old male with a pertinent history of hypertension, hyperlipidemia, type 2 diabetes, insomnia, and depression who presents to this ED via private vehicle accompanied by son for evaluation of depression.    Patient reports that he is feeling depressed with a lack of energy but denies suicidal ideation. It was noted by a family member that the patient was losing balance and focus recently. Patient reports that he had his last depressive episode starting from  until [place]. Patient currently takes prozac, ambilify, trazadone, and clonazepam and recently had a dosage  increase in his ambilify. Patient denies additional symptoms or complaints at this time.       REVIEW OF SYSTEMS     Constitutional:  Denies fever or chills. Positive for loss of concentration  HENT:  Denies sore throat   Respiratory:  Denies cough or shortness of breath   Cardiovascular:  Denies chest pain or palpitations  GI:  Denies abdominal pain, nausea, or vomiting  Musculoskeletal:  Denies any new extremity pain   Skin:  Denies rash   Neurologic:  Denies headache, focal weakness or sensory changes    All other systems reviewed and are negative      PAST MEDICAL HISTORY:  Past Medical History:   Diagnosis Date     Diabetes (H)      Hypertension      Syncope, unspecified syncope type        PAST SURGICAL HISTORY:  Past Surgical History:   Procedure Laterality Date     ANTERIOR FUSION CERVICAL SPINE  07/26/2021    C5-6 plate removal and C6-7 decompression and fusion     FUSION CERVICAL ANTERIOR ONE LEVEL N/A 7/26/2021    Procedure: CERVICAL 6-CERVICAL 7 ANTERIOR CERVICAL DECOMPRESSION AND FUSION;  Surgeon: Nataly Layton MD;  [place] [facility] [place]     IR CERVICAL EPIDURAL STEROID INJECTION  1/19/2012     IR CERVICAL EPIDURAL STEROID INJECTION  6/25/2021     REMOVE HARDWARE CERVICAL ANTERIOR SPINE N/A 7/26/2021    Procedure: REMOVAL OF PRIOR CERVICAL 5-CERVICAL 6 CERVICAL PLATE;  Surgeon: Nataly Layton MD;  [place] [facility] [place]           CURRENT MEDICATIONS:    acetaminophen (TYLENOL) 325 MG tablet  amLODIPine (NORVASC) 5 MG tablet  ARIPiprazole (ABILIFY) 2 MG tablet  aspirin (ASA) 325 MG tablet  FLUoxetine (PROZAC) 40 MG capsule  lisinopriL (PRINIVIL,ZESTRIL) 20 MG tablet  multivitamin, therapeutic (THERA-VIT) TABS tablet  pravastatin (PRAVACHOL) 80 MG tablet  traMADol (ULTRAM) 50 MG tablet        ALLERGIES:  No Known Allergies    FAMILY HISTORY:  History reviewed. No pertinent family history.    SOCIAL HISTORY:   Social History     Socioeconomic History     Marital status: Patient  Declined     Spouse name: None     Number of children: None     Years of education: None     Highest education level: None   Tobacco Use     Smoking status: Former Smoker     Smokeless tobacco: Never Used   Vaping Use     Vaping Use: Never used   Substance and Sexual Activity     Alcohol use: Yes     Drug use: Never       VITAL SIGNS: /75   Pulse 69   Temp 97.7  F (36.5  C) (Oral)   Resp 18   Ht 1.829 m (6')   Wt 86.2 kg (190 lb)   SpO2 96%   BMI 25.77 kg/m     Constitutional:  Well developed, Well nourished,  HENT:  Normocephalic, Atraumatic, Oropharynx moist, Nose normal.   Neck:  Normal range of motion, Supple, No stridor.   Eyes:  EOMI, Conjunctiva normal, No discharge.   Respiratory:  Breathing comfortably, No respiratory distress,    Cardiovascular:  Normal pulses   Musculoskeletal:  No edema or cyanosis, no deformities  Integument:  Dry, No erythema, No rash.  Neurologic:  Alert & oriented x 3, No obvious focal deficits noted.   Psychiatric:  Affect normal, Judgment normal       LAB:  All pertinent labs reviewed and interpreted.  Results for orders placed or performed during the hospital encounter of 06/06/22   Basic metabolic panel   Result Value Ref Range    Sodium 141 136 - 145 mmol/L    Potassium 3.7 3.5 - 5.0 mmol/L    Chloride 105 98 - 107 mmol/L    Carbon Dioxide (CO2) 25 22 - 31 mmol/L    Anion Gap 11 5 - 18 mmol/L    Urea Nitrogen 24 8 - 28 mg/dL    Creatinine 1.19 0.70 - 1.30 mg/dL    Calcium 9.1 8.5 - 10.5 mg/dL    Glucose 97 70 - 125 mg/dL    GFR Estimate 63 >60 mL/min/1.73m2   TSH with free T4 reflex   Result Value Ref Range    TSH 1.46 0.30 - 5.00 uIU/mL   Ethyl Alcohol Level   Result Value Ref Range    Alcohol, Blood <10 None detected mg/dL   Result Value Ref Range    Salicylate <8 2 - 25 mg/dL   Acetaminophen level   Result Value Ref Range    Acetaminophen <3.0 (L) 10.0 - 20.0 ug/mL   CBC with platelets and differential   Result Value Ref Range    WBC Count 7.7 4.0 - 11.0 10e3/uL     RBC Count 5.38 4.40 - 5.90 10e6/uL    Hemoglobin 14.4 13.3 - 17.7 g/dL    Hematocrit 44.5 40.0 - 53.0 %    MCV 83 78 - 100 fL    MCH 26.8 26.5 - 33.0 pg    MCHC 32.4 31.5 - 36.5 g/dL    RDW 13.8 10.0 - 15.0 %    Platelet Count 344 150 - 450 10e3/uL    % Neutrophils 48 %    % Lymphocytes 38 %    % Monocytes 11 %    % Eosinophils 2 %    % Basophils 1 %    % Immature Granulocytes 0 %    NRBCs per 100 WBC 0 <1 /100    Absolute Neutrophils 3.8 1.6 - 8.3 10e3/uL    Absolute Lymphocytes 2.9 0.8 - 5.3 10e3/uL    Absolute Monocytes 0.8 0.0 - 1.3 10e3/uL    Absolute Eosinophils 0.1 0.0 - 0.7 10e3/uL    Absolute Basophils 0.0 0.0 - 0.2 10e3/uL    Absolute Immature Granulocytes 0.0 <=0.4 10e3/uL    Absolute NRBCs 0.0 10e3/uL         I, Herberth Hayes, am serving as a scribe to document services personally performed by Dr. Charles based on my observation and the provider's statements to me. I, Garo Charles, DO attest that Herberth Hayes is acting in a scribe capacity, has observed my performance of the services and has documented them in accordance with my direction.    Garo Charles DO  Emergency Medicine  [facility] EMERGENCY DEPARTMENT  [address] [place] [id number] [phone]  Dept: [phone]     Garo Charles MD  07/05/22 0757

## 2022-06-07 NOTE — DISCHARGE INSTRUCTIONS
Continue your previously prescribed medications as directed.  Follow-up with your psychiatrist as scheduled.  Also, I recommend following up with podiatry regarding your bunion.  Return to the ER right away if you develop any worsening symptoms or other concerns.

## 2022-07-24 ENCOUNTER — HEALTH MAINTENANCE LETTER (OUTPATIENT)
Age: 77
End: 2022-07-24

## 2022-10-02 ENCOUNTER — HEALTH MAINTENANCE LETTER (OUTPATIENT)
Age: 77
End: 2022-10-02

## 2023-02-11 ENCOUNTER — HEALTH MAINTENANCE LETTER (OUTPATIENT)
Age: 78
End: 2023-02-11

## 2023-05-20 ENCOUNTER — HEALTH MAINTENANCE LETTER (OUTPATIENT)
Age: 78
End: 2023-05-20

## 2023-10-21 ENCOUNTER — HEALTH MAINTENANCE LETTER (OUTPATIENT)
Age: 78
End: 2023-10-21

## 2024-03-05 ENCOUNTER — APPOINTMENT (OUTPATIENT)
Dept: CT IMAGING | Facility: HOSPITAL | Age: 79
End: 2024-03-05
Attending: PHYSICIAN ASSISTANT
Payer: COMMERCIAL

## 2024-03-05 ENCOUNTER — APPOINTMENT (OUTPATIENT)
Dept: RADIOLOGY | Facility: HOSPITAL | Age: 79
End: 2024-03-05
Attending: PHYSICIAN ASSISTANT
Payer: COMMERCIAL

## 2024-03-05 ENCOUNTER — HOSPITAL ENCOUNTER (OUTPATIENT)
Facility: HOSPITAL | Age: 79
Setting detail: OBSERVATION
Discharge: HOME | End: 2024-03-06
Attending: EMERGENCY MEDICINE | Admitting: EMERGENCY MEDICINE
Payer: COMMERCIAL

## 2024-03-05 DIAGNOSIS — I16.0 HYPERTENSIVE URGENCY: ICD-10-CM

## 2024-03-05 DIAGNOSIS — R41.0 CONFUSION: ICD-10-CM

## 2024-03-05 LAB
ALBUMIN SERPL BCG-MCNC: 4 G/DL (ref 3.5–5.2)
ALBUMIN UR-MCNC: NEGATIVE MG/DL
ALP SERPL-CCNC: 111 U/L (ref 40–150)
ALT SERPL W P-5'-P-CCNC: 17 U/L (ref 0–70)
ANION GAP SERPL CALCULATED.3IONS-SCNC: 5 MMOL/L (ref 7–15)
APPEARANCE UR: CLEAR
AST SERPL W P-5'-P-CCNC: 20 U/L (ref 0–45)
BASOPHILS # BLD AUTO: 0.1 10E3/UL (ref 0–0.2)
BASOPHILS NFR BLD AUTO: 1 %
BILIRUB DIRECT SERPL-MCNC: <0.2 MG/DL (ref 0–0.3)
BILIRUB SERPL-MCNC: 0.5 MG/DL
BILIRUB UR QL STRIP: NEGATIVE
BUN SERPL-MCNC: 12.6 MG/DL (ref 8–23)
CALCIUM SERPL-MCNC: 9.9 MG/DL (ref 8.8–10.2)
CHLORIDE SERPL-SCNC: 103 MMOL/L (ref 98–107)
COLOR UR AUTO: COLORLESS
CREAT SERPL-MCNC: 1.33 MG/DL (ref 0.67–1.17)
DEPRECATED HCO3 PLAS-SCNC: 33 MMOL/L (ref 22–29)
EGFRCR SERPLBLD CKD-EPI 2021: 54 ML/MIN/1.73M2
EOSINOPHIL # BLD AUTO: 0.1 10E3/UL (ref 0–0.7)
EOSINOPHIL NFR BLD AUTO: 2 %
ERYTHROCYTE [DISTWIDTH] IN BLOOD BY AUTOMATED COUNT: 13.8 % (ref 10–15)
GLUCOSE BLDC GLUCOMTR-MCNC: 104 MG/DL (ref 70–99)
GLUCOSE SERPL-MCNC: 116 MG/DL (ref 70–99)
GLUCOSE UR STRIP-MCNC: NEGATIVE MG/DL
HCT VFR BLD AUTO: 47.4 % (ref 40–53)
HGB BLD-MCNC: 15.2 G/DL (ref 13.3–17.7)
HGB UR QL STRIP: NEGATIVE
HOLD SPECIMEN: NORMAL
HOLD SPECIMEN: NORMAL
IMM GRANULOCYTES # BLD: 0 10E3/UL
IMM GRANULOCYTES NFR BLD: 0 %
KETONES UR STRIP-MCNC: NEGATIVE MG/DL
LEUKOCYTE ESTERASE UR QL STRIP: NEGATIVE
LYMPHOCYTES # BLD AUTO: 2.6 10E3/UL (ref 0.8–5.3)
LYMPHOCYTES NFR BLD AUTO: 34 %
MCH RBC QN AUTO: 27.1 PG (ref 26.5–33)
MCHC RBC AUTO-ENTMCNC: 32.1 G/DL (ref 31.5–36.5)
MCV RBC AUTO: 85 FL (ref 78–100)
MONOCYTES # BLD AUTO: 0.6 10E3/UL (ref 0–1.3)
MONOCYTES NFR BLD AUTO: 8 %
NEUTROPHILS # BLD AUTO: 4.3 10E3/UL (ref 1.6–8.3)
NEUTROPHILS NFR BLD AUTO: 55 %
NITRATE UR QL: NEGATIVE
NRBC # BLD AUTO: 0 10E3/UL
NRBC BLD AUTO-RTO: 0 /100
PH UR STRIP: 7.5 [PH] (ref 5–7)
PLATELET # BLD AUTO: 348 10E3/UL (ref 150–450)
POTASSIUM SERPL-SCNC: 4.2 MMOL/L (ref 3.4–5.3)
PROT SERPL-MCNC: 7.4 G/DL (ref 6.4–8.3)
RBC # BLD AUTO: 5.61 10E6/UL (ref 4.4–5.9)
RBC URINE: <1 /HPF
SODIUM SERPL-SCNC: 141 MMOL/L (ref 135–145)
SP GR UR STRIP: 1.01 (ref 1–1.03)
TROPONIN T SERPL HS-MCNC: 22 NG/L
UROBILINOGEN UR STRIP-MCNC: <2 MG/DL
WBC # BLD AUTO: 7.7 10E3/UL (ref 4–11)
WBC URINE: 0 /HPF

## 2024-03-05 PROCEDURE — 99285 EMERGENCY DEPT VISIT HI MDM: CPT | Mod: 25

## 2024-03-05 PROCEDURE — 258N000003 HC RX IP 258 OP 636: Performed by: PHYSICIAN ASSISTANT

## 2024-03-05 PROCEDURE — 250N000011 HC RX IP 250 OP 636: Performed by: PHYSICIAN ASSISTANT

## 2024-03-05 PROCEDURE — 96374 THER/PROPH/DIAG INJ IV PUSH: CPT

## 2024-03-05 PROCEDURE — 85025 COMPLETE CBC W/AUTO DIFF WBC: CPT | Performed by: PHYSICIAN ASSISTANT

## 2024-03-05 PROCEDURE — 81001 URINALYSIS AUTO W/SCOPE: CPT | Performed by: PHYSICIAN ASSISTANT

## 2024-03-05 PROCEDURE — 96376 TX/PRO/DX INJ SAME DRUG ADON: CPT

## 2024-03-05 PROCEDURE — 93005 ELECTROCARDIOGRAM TRACING: CPT | Performed by: PHYSICIAN ASSISTANT

## 2024-03-05 PROCEDURE — 84484 ASSAY OF TROPONIN QUANT: CPT | Performed by: PHYSICIAN ASSISTANT

## 2024-03-05 PROCEDURE — 99222 1ST HOSP IP/OBS MODERATE 55: CPT | Mod: AI | Performed by: INTERNAL MEDICINE

## 2024-03-05 PROCEDURE — 71046 X-RAY EXAM CHEST 2 VIEWS: CPT

## 2024-03-05 PROCEDURE — 96361 HYDRATE IV INFUSION ADD-ON: CPT

## 2024-03-05 PROCEDURE — 250N000013 HC RX MED GY IP 250 OP 250 PS 637: Performed by: INTERNAL MEDICINE

## 2024-03-05 PROCEDURE — 36415 COLL VENOUS BLD VENIPUNCTURE: CPT | Performed by: PHYSICIAN ASSISTANT

## 2024-03-05 PROCEDURE — 120N000001 HC R&B MED SURG/OB

## 2024-03-05 PROCEDURE — 80053 COMPREHEN METABOLIC PANEL: CPT | Performed by: PHYSICIAN ASSISTANT

## 2024-03-05 PROCEDURE — 82962 GLUCOSE BLOOD TEST: CPT

## 2024-03-05 PROCEDURE — 250N000011 HC RX IP 250 OP 636: Performed by: FAMILY MEDICINE

## 2024-03-05 PROCEDURE — 70450 CT HEAD/BRAIN W/O DYE: CPT

## 2024-03-05 RX ORDER — GABAPENTIN 300 MG/1
300 CAPSULE ORAL 2 TIMES DAILY
Status: DISCONTINUED | OUTPATIENT
Start: 2024-03-05 | End: 2024-03-06 | Stop reason: HOSPADM

## 2024-03-05 RX ORDER — PILOCARPINE HYDROCHLORIDE 5 MG/1
5 TABLET, FILM COATED ORAL 3 TIMES DAILY
Status: DISCONTINUED | OUTPATIENT
Start: 2024-03-05 | End: 2024-03-06 | Stop reason: HOSPADM

## 2024-03-05 RX ORDER — HYDRALAZINE HYDROCHLORIDE 20 MG/ML
10 INJECTION INTRAMUSCULAR; INTRAVENOUS ONCE
Status: COMPLETED | OUTPATIENT
Start: 2024-03-05 | End: 2024-03-05

## 2024-03-05 RX ORDER — VENLAFAXINE HYDROCHLORIDE 75 MG/1
75 CAPSULE, EXTENDED RELEASE ORAL DAILY
COMMUNITY

## 2024-03-05 RX ORDER — PROCHLORPERAZINE 25 MG
12.5 SUPPOSITORY, RECTAL RECTAL EVERY 12 HOURS PRN
Status: DISCONTINUED | OUTPATIENT
Start: 2024-03-05 | End: 2024-03-06 | Stop reason: HOSPADM

## 2024-03-05 RX ORDER — TRAZODONE HYDROCHLORIDE 50 MG/1
50 TABLET, FILM COATED ORAL AT BEDTIME
Status: DISCONTINUED | OUTPATIENT
Start: 2024-03-05 | End: 2024-03-06 | Stop reason: HOSPADM

## 2024-03-05 RX ORDER — PRAVASTATIN SODIUM 20 MG
80 TABLET ORAL AT BEDTIME
Status: DISCONTINUED | OUTPATIENT
Start: 2024-03-05 | End: 2024-03-06 | Stop reason: HOSPADM

## 2024-03-05 RX ORDER — VENLAFAXINE HYDROCHLORIDE 75 MG/1
75 CAPSULE, EXTENDED RELEASE ORAL DAILY
Status: DISCONTINUED | OUTPATIENT
Start: 2024-03-06 | End: 2024-03-06 | Stop reason: HOSPADM

## 2024-03-05 RX ORDER — HYDRALAZINE HYDROCHLORIDE 20 MG/ML
10 INJECTION INTRAMUSCULAR; INTRAVENOUS EVERY 4 HOURS PRN
Status: DISCONTINUED | OUTPATIENT
Start: 2024-03-05 | End: 2024-03-06

## 2024-03-05 RX ORDER — GABAPENTIN 300 MG/1
300 CAPSULE ORAL 2 TIMES DAILY
COMMUNITY

## 2024-03-05 RX ORDER — CALCIUM CARBONATE 500 MG/1
1000 TABLET, CHEWABLE ORAL 4 TIMES DAILY PRN
Status: DISCONTINUED | OUTPATIENT
Start: 2024-03-05 | End: 2024-03-06 | Stop reason: HOSPADM

## 2024-03-05 RX ORDER — TRAZODONE HYDROCHLORIDE 50 MG/1
50 TABLET, FILM COATED ORAL AT BEDTIME
COMMUNITY

## 2024-03-05 RX ORDER — HYDRALAZINE HYDROCHLORIDE 10 MG/1
10 TABLET, FILM COATED ORAL EVERY 4 HOURS PRN
Status: DISCONTINUED | OUTPATIENT
Start: 2024-03-05 | End: 2024-03-06

## 2024-03-05 RX ORDER — SALIVA STIMULANT COMB. NO.3
2 SPRAY, NON-AEROSOL (ML) MUCOUS MEMBRANE EVERY 6 HOURS PRN
Status: DISCONTINUED | OUTPATIENT
Start: 2024-03-05 | End: 2024-03-06 | Stop reason: HOSPADM

## 2024-03-05 RX ORDER — PILOCARPINE HYDROCHLORIDE 5 MG/1
5 TABLET, FILM COATED ORAL 3 TIMES DAILY
COMMUNITY

## 2024-03-05 RX ORDER — LIDOCAINE 40 MG/G
CREAM TOPICAL
Status: DISCONTINUED | OUTPATIENT
Start: 2024-03-05 | End: 2024-03-06 | Stop reason: HOSPADM

## 2024-03-05 RX ORDER — AMOXICILLIN 250 MG
1 CAPSULE ORAL 2 TIMES DAILY PRN
Status: DISCONTINUED | OUTPATIENT
Start: 2024-03-05 | End: 2024-03-06 | Stop reason: HOSPADM

## 2024-03-05 RX ORDER — PROCHLORPERAZINE MALEATE 5 MG
5 TABLET ORAL EVERY 6 HOURS PRN
Status: DISCONTINUED | OUTPATIENT
Start: 2024-03-05 | End: 2024-03-06 | Stop reason: HOSPADM

## 2024-03-05 RX ORDER — AMOXICILLIN 250 MG
2 CAPSULE ORAL 2 TIMES DAILY PRN
Status: DISCONTINUED | OUTPATIENT
Start: 2024-03-05 | End: 2024-03-06 | Stop reason: HOSPADM

## 2024-03-05 RX ORDER — LISINOPRIL 5 MG/1
10 TABLET ORAL DAILY
Status: DISCONTINUED | OUTPATIENT
Start: 2024-03-06 | End: 2024-03-06 | Stop reason: HOSPADM

## 2024-03-05 RX ORDER — LISINOPRIL 10 MG/1
10 TABLET ORAL DAILY
COMMUNITY

## 2024-03-05 RX ADMIN — HYDRALAZINE HYDROCHLORIDE 10 MG: 20 INJECTION INTRAMUSCULAR; INTRAVENOUS at 16:43

## 2024-03-05 RX ADMIN — SODIUM CHLORIDE 1000 ML: 9 INJECTION, SOLUTION INTRAVENOUS at 14:52

## 2024-03-05 RX ADMIN — PRAVASTATIN SODIUM 80 MG: 20 TABLET ORAL at 22:24

## 2024-03-05 RX ADMIN — HYDRALAZINE HYDROCHLORIDE 10 MG: 20 INJECTION INTRAMUSCULAR; INTRAVENOUS at 14:52

## 2024-03-05 RX ADMIN — GABAPENTIN 300 MG: 300 CAPSULE ORAL at 20:10

## 2024-03-05 RX ADMIN — PILOCARPINE HYDROCHLORIDE 5 MG: 5 TABLET, FILM COATED ORAL at 20:10

## 2024-03-05 RX ADMIN — TRAZODONE HYDROCHLORIDE 50 MG: 50 TABLET ORAL at 22:23

## 2024-03-05 ASSESSMENT — ACTIVITIES OF DAILY LIVING (ADL)
ADLS_ACUITY_SCORE: 38

## 2024-03-05 NOTE — PHARMACY-ADMISSION MEDICATION HISTORY
Pharmacist Admission Medication History    Admission medication history is complete. The information provided in this note is only as accurate as the sources available at the time of the update.    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 New start pilocarpine 12/23, decrease venlafaxine 2/24 in attempts to quench. Recommend decrease trazodone as 1 in 3 experience xerostoma     Changes made to PTA medication list:  Added: Effexor, vitD, gabapentin, pilocarpine, trazodone  Deleted: aspirin, amlodipine, Abilify, Prozac, MVI, tramadol  Changed: lisinopril,     Allergies reviewed with patient and updates made in EHR: yes    Medication History Completed By: Bruno Miner [facility] 3/5/2024 3:58 PM    PTA Med List   Medication Sig Last Dose    artificial saliva (BIOTENE MT) AERS spray Take 2 sprays by mouth every 6 hours as needed for dry mouth     gabapentin (NEURONTIN) 300 MG capsule Take 300 mg by mouth 2 times daily 3/5/2024 at am    lisinopril (ZESTRIL) 10 MG tablet Take 10 mg by mouth daily 3/5/2024 at am    pilocarpine (SALAGEN) 5 MG tablet Take 5 mg by mouth 3 times daily 3/5/2024 at am    pravastatin (PRAVACHOL) 80 MG tablet Take 80 mg by mouth At Bedtime  3/4/2024 at hs    traZODone (DESYREL) 50 MG tablet Take 50 mg by mouth at bedtime 3/4/2024 at hs    venlafaxine (EFFEXOR XR) 75 MG 24 hr capsule Take 75 mg by mouth daily 3/5/2024 at am    Vitamin D3 (CHOLECALCIFEROL) 125 MCG (5000 UT) tablet Take 1 tablet by mouth daily 3/5/2024 at am

## 2024-03-05 NOTE — ED TRIAGE NOTES
The patient presents with his son at side. The patient lives independently at home. The patient was seen on 3/3/2024, pt LNW. Pt was seen at the dentist today at 11:00 and they noticed he was not making sense, pt could not recall recent events. Pt does not recall driving to the dentist today. Pt BS is 104. Pt knows his name and . Neuro eval called in triage.

## 2024-03-05 NOTE — ED NOTES
"Pt is now A&Ox4. Pt's son states he was doing \"memory exercises\" with pt for 30 minutes and he states the pt now seems to be back to normal.  "

## 2024-03-05 NOTE — ED NOTES
"Emergency Department Midlevel Supervisory Note     I had a face to face encounter with this patient seen by the Advanced Practice Provider (ABDIRASHID). I personally made/approved the management plan and take responsibility for the patient management. I personally saw patient and performed a substantive portion of the visit including all aspects of the medical decision making.     ED Course:  1:16 PM  Viridiana Salomon PA-C  staffed patient with me. I agree with their assessment and plan of management, and I will see the patient.  1:22 PM I met with the patient to introduce myself, gather additional history, perform my initial exam, and discuss the plan.   2:14 PM CBC reassuring, UA neg for infection.      Brief HPI:     Shant Muñoz is a 78 year old male who presents for evaluation of altered mental status. LKW was 3/3/24. He was seen at the dentist today when he was noticed to not be making any sense. Additionally, patient was able to recall recent events and does not recall driving to the dentist.     I, Daniella Varghese, am serving as a scribe to document services personally performed by Varinder Garza DO,, based on my observations and the provider's statements to me.   I, Varinder Garza DO, attest that Daniella Varghese was acting in a scribe capacity, has observed my performance of the services and has documented them in accordance with my direction.    Brief Physical Exam: BP (!) 263/123   Pulse 62   Temp 96.8  F (36  C) (Temporal)   Resp 16   Ht 1.778 m (5' 10\")   Wt 90.7 kg (200 lb)   SpO2 97%   BMI 28.70 kg/m    Constitutional:  Alert, in no acute distress  EYES: Conjunctivae clear  HENT:  Atraumatic  Respiratory:  Respirations even, unlabored, in no acute respiratory distress  Cardiovascular:  Regular rate and rhythm, good peripheral perfusion  GI: Soft, non-distended, non-tender  Musculoskeletal:  Moves all 4 extremities equally, grossly symmetrical strength  Integument: Warm & dry. No appreciable rash, " erythema.  Neurologic:  Alert & oriented, speech clear and fluent, no focal deficits noted.  CN II-XII GI, normal finger to nose b/l, sensation grossly intact b/l UE/LE, no pronator drift, normal gait  Psych: Normal mood and affect       MDM:  Pt seen in conjunction with ABDIRASHID Viridiana Salomon.  Pt here from dental clinic after he had appointment today and seemed confused.  Son called, who brought him into ED now.  Pt last seen by family (son) on Sunday and was baseline/normal. Pt confused for why he was at dentist per report and then kept asking son where they were going as they were driving to hospital and he explained it to them multiple times. Pt otherwise has no focal neuro deficits or complaints.  He's AOx3 for me here, seems very unconcerned. Consideration for electrolyte abnormality, UTI, TGA or other underlying neurologic process. No indication for acute stroke code as last known well was Sunday and symptoms are very much global with no focal symptoms or abnormal findings.  Agree with labs, EKG, CT brain.         1. Confusion        Labs and Imaging:  Results for orders placed or performed during the hospital encounter of 03/05/24   Head CT w/o contrast    Impression    IMPRESSION:  1.  No hyperdense hemorrhage.    2.  No convincing evidence for focal acute infarction with moderate chronic small vessel ischemic/degenerative changes of aging presumed in the periventricular deep white matter of both cerebral hemispheres. Age-appropriate generalized cerebral and   cerebellar parenchymal volume loss.    3.  Please see above for additional details and description including polypoid membrane thickening or dependent air-fluid levels in the sphenoid sinus, which may indicate inflammatory sinusitis at this level.   Glucose by meter   Result Value Ref Range    GLUCOSE BY METER POCT 104 (H) 70 - 99 mg/dL   UA with Microscopic reflex to Culture    Specimen: Urine, Clean Catch   Result Value Ref Range    Color Urine  Colorless Colorless, Straw, Light Yellow, Yellow    Appearance Urine Clear Clear    Glucose Urine Negative Negative mg/dL    Bilirubin Urine Negative Negative    Ketones Urine Negative Negative mg/dL    Specific Gravity Urine 1.006 1.001 - 1.030    Blood Urine Negative Negative    pH Urine 7.5 (H) 5.0 - 7.0    Protein Albumin Urine Negative Negative mg/dL    Urobilinogen Urine <2.0 <2.0 mg/dL    Nitrite Urine Negative Negative    Leukocyte Esterase Urine Negative Negative    RBC Urine <1 <=2 /HPF    WBC Urine 0 <=5 /HPF   CBC with platelets and differential   Result Value Ref Range    WBC Count 7.7 4.0 - 11.0 10e3/uL    RBC Count 5.61 4.40 - 5.90 10e6/uL    Hemoglobin 15.2 13.3 - 17.7 g/dL    Hematocrit 47.4 40.0 - 53.0 %    MCV 85 78 - 100 fL    MCH 27.1 26.5 - 33.0 pg    MCHC 32.1 31.5 - 36.5 g/dL    RDW 13.8 10.0 - 15.0 %    Platelet Count 348 150 - 450 10e3/uL    % Neutrophils 55 %    % Lymphocytes 34 %    % Monocytes 8 %    % Eosinophils 2 %    % Basophils 1 %    % Immature Granulocytes 0 %    NRBCs per 100 WBC 0 <1 /100    Absolute Neutrophils 4.3 1.6 - 8.3 10e3/uL    Absolute Lymphocytes 2.6 0.8 - 5.3 10e3/uL    Absolute Monocytes 0.6 0.0 - 1.3 10e3/uL    Absolute Eosinophils 0.1 0.0 - 0.7 10e3/uL    Absolute Basophils 0.1 0.0 - 0.2 10e3/uL    Absolute Immature Granulocytes 0.0 <=0.4 10e3/uL    Absolute NRBCs 0.0 10e3/uL       I have reviewed the relevant laboratory studies above.      EKG: I reviewed and independently interpreted the patient's EKG, with comments made as listed below. Please see scanned EKG for full report.   NSR, rate 63, normal intervals, no acute ischemia    Procedures:  I was present for the key portions of procedures documented in ABDIRASHID/midlevel note, see midlevel note for further details.    Varinder Garza DO  [facility] EMERGENCY DEPARTMENT  [address] [place] [id number] [phone]     Garo Garza MD  03/05/24 3009

## 2024-03-05 NOTE — ED PROVIDER NOTES
"EMERGENCY DEPARTMENT ENCOUNTER   NAME: Shant Muñoz ; AGE: 78 year old male ; YOB: 1945 ; MRN: [id number] ; PCP: Kera Barroso     Evaluation Date & Time: No admission date for patient encounter.    ED Provider: Viridiana Salomon PA-C    CHIEF COMPLAINT     Altered Mental Status      FINAL ASSESSMENT       ICD-10-CM    1. Confusion  R41.0       2. Hypertensive urgency  I16.0           ED COURSE, MEDICAL DECISION MAKING, PLAN     ED course   1:12 PM: Evaluated patient in triage due to \"neuro assessment\" being called in triage. No stroke code called. Last known normal >24 hours. No lateralizing neuro symptoms. Glucose 104. No reported trauma. Will order labs and imaging. Staffed with Dr. Garza.   2:38 PM: Rechecked and updated patient. Will reach out to hospitalist for admission.   2:45 PM: 10mg of Hydralazine ordered for BP.   3:38 PM: Spoke with Dr. Mir from admitting who accepts patient to cardiac tele.     ______________________________________________________________________    Subjective   Shant Muñoz is a 78 year old male with pertinent medical history of DM2, HTN, HLD, HTN, CKD3 presenting for acute confusion noted by his dental hygienist this AM. Son was last with patient on Monday (2 days ago) and reports he was normal at that time. Patient does not have any complaints.     Objective   Physical exam positives: Confusion. Oriented to self only.     Physical exam negatives: No lateralized neuro deficits. Upper and lower extremity strength strong and equal. No pronator drift. Speech is fluid and articulate. No large bruises.     Vitals: BP elevated to 263/123 otherwise vitally normal.     Assessment    Labs: CBC unremarkable.  BMP showing a creatinine of 1.33 with a GFR of 54 (1.49 and 47 respectively on 8/24/2023).  Hepatic panel unremarkable.  Troponin within normal limits.  UA noninfectious.  EKG: Normal sinus rhythm with a rate of 63.  No interval prolongation.  No ischemia or " STEMI.  Imaging (independently reviewed by myself): Head CT without acute findings. Chronic small vessel vs degenerative changes present. Xray chest without acute findings.     Final assessment: Signs, symptoms, and workup suggests acute confusion and hypertensive urgency.     Differentials considered: CVA/TIA (no stroke code called due to last known normal and no lateralized neuro deficits). Infection (no evidence of UTI or pneumonia. No abdominal pain. No leukocytosis. No systemic s/s present).     Plan   ER interventions: 1L of NS and 10mg hydralazine.     Disposition: Admission to cardiac tele.     ______________________________________________________________________    *All pertinent lab & imaging studies independently reviewed. (See chart for details)   *Discussed the results of all the tests and plan with patient and family/guardians.   *All questions were answered.   *The patient and/or family/guardian acknowledged understanding and was agreeable with the care plan.      HISTORY OF PRESENT ILLNESS   Patient information was obtained from: Patient's son    Use of Intrepreter: N/A     Shant Muñoz is a 78 year old male with a pertinent history of DM2, HTN, HLD, HTN, CKD3 who presents to the ED by means of walk in with son for evaluation of confusion.     Patient was reportedly at his dentist this morning and the dental hygienist noticed that he seemed very confused.  Was not able to recall events that had just happened.  The dental hygienist called patient's son who then picked him up and brought him here.    Son reports that this is significantly different than his baseline.  Son states that his last known normal was on Monday 3/3 (2 days ago).   Reports that he does have some baseline cognitive decline, but nothing nearly as severe as today.  Son states that he had electrotherapy done 2 years ago and they feel like he has had a decline ever since then.  Today is reportedly markedly different than his  baseline.    Denies having any symptoms such as urinary frequency, urgency, abdominal pain, headache, dizziness.    Denies any trauma.  Patient is not on a blood thinner.    Per my chart review, patient had his Medicare annual visit on 11/27/2023.  He complained of feeling dizzy so they decreased his lisinopril.  He was also seen in August for dizziness and they had recommended stopping the amlodipine at that time.      MEDICAL HISTORY     Past Medical History:   Diagnosis Date    Diabetes (H)     Hypertension     Syncope, unspecified syncope type        Past Surgical History:   Procedure Laterality Date    ANTERIOR FUSION CERVICAL SPINE  07/26/2021    C5-6 plate removal and C6-7 decompression and fusion    FUSION CERVICAL ANTERIOR ONE LEVEL N/A 7/26/2021    Procedure: CERVICAL 6-CERVICAL 7 ANTERIOR CERVICAL DECOMPRESSION AND FUSION;  Surgeon: Nataly Layton MD;  Location: [facility] OR    IR CERVICAL EPIDURAL STEROID INJECTION  1/19/2012    IR CERVICAL EPIDURAL STEROID INJECTION  6/25/2021    IR LYMPH NODE BIOPSY  12/21/2022    REMOVE HARDWARE CERVICAL ANTERIOR SPINE N/A 7/26/2021    Procedure: REMOVAL OF PRIOR CERVICAL 5-CERVICAL 6 CERVICAL PLATE;  Surgeon: Nataly Layton MD;  Location: [facility] OR       No family history on file.    Social History     Tobacco Use    Smoking status: Former    Smokeless tobacco: Never   Vaping Use    Vaping Use: Never used   Substance Use Topics    Alcohol use: Yes    Drug use: Never       acetaminophen (TYLENOL) 325 MG tablet  amLODIPine (NORVASC) 5 MG tablet  ARIPiprazole (ABILIFY) 2 MG tablet  aspirin (ASA) 325 MG tablet  FLUoxetine (PROZAC) 40 MG capsule  lisinopriL (PRINIVIL,ZESTRIL) 20 MG tablet  multivitamin, therapeutic (THERA-VIT) TABS tablet  pravastatin (PRAVACHOL) 80 MG tablet  traMADol (ULTRAM) 50 MG tablet          PHYSICAL EXAM     First Vitals:  Patient Vitals for the past 24 hrs:   BP Temp Temp src Pulse Resp SpO2 Height Weight   03/05/24 1453  "(!) 239/110 -- -- -- -- -- -- --   03/05/24 1452 (!) 239/110 -- -- -- -- -- -- --   03/05/24 1438 (!) 245/112 -- -- -- -- -- -- --   03/05/24 1305 -- 96.8  F (36  C) Temporal -- -- -- 1.778 m (5' 10\") 90.7 kg (200 lb)   03/05/24 1304 (!) 263/123 -- -- 62 16 97 % -- --         PHYSICAL EXAM:   Constitutional: No acute distress.  Neuro: Oriented to self only. Awake and alert. GCS 15. Cranial nerves II-XII intact. Speech is fluid and articulate. No facial droop. No pronator drift.  strength strong and equal b/l. Upper and lower extremity strength strong and equal b/l. Finger-to-nose intact. Rapid alternating movements intact. Sensations intact.   Psych: Calm and cooperative.  Head: Normocephalic.  Eyes: PERRL. EOMI. Conjunctivae clear.     Mouth: Pink and moist.    Neck: FROM. No pain over the cervical spine or paraspinal musculature.   Cardio: Regular rate. Adequate perfusion to extremities. Regular rhythm. No murmurs.  Pulmonary: Oxygenating well on RA. No labored breathing. CTA b/l.  Abdomen: BS present. Soft and non-distended. No palpable pain.  Back: Appears to move freely. No pain over the thoracic/lumbar spine. No paraspinal tenderness. No CVA tenderness.   Upper extremities: Moves freely. No edema. Distal pulses intact. Sensations intact.   Lower extremities: Moves freely. No edema. Distal pulses intact. Sensations intact.   Skin: Natural color, warm, dry, intact.       RESULTS     LAB:  All pertinent labs reviewed and interpreted  Labs Ordered and Resulted from Time of ED Arrival to Time of ED Departure   GLUCOSE BY METER - Abnormal       Result Value    GLUCOSE BY METER POCT 104 (*)    BASIC METABOLIC PANEL - Abnormal    Sodium 141      Potassium 4.2      Chloride 103      Carbon Dioxide (CO2) 33 (*)     Anion Gap 5 (*)     Urea Nitrogen 12.6      Creatinine 1.33 (*)     GFR Estimate 54 (*)     Calcium 9.9      Glucose 116 (*)    ROUTINE UA WITH MICROSCOPIC REFLEX TO CULTURE - Abnormal    Color Urine " Colorless      Appearance Urine Clear      Glucose Urine Negative      Bilirubin Urine Negative      Ketones Urine Negative      Specific Gravity Urine 1.006      Blood Urine Negative      pH Urine 7.5 (*)     Protein Albumin Urine Negative      Urobilinogen Urine <2.0      Nitrite Urine Negative      Leukocyte Esterase Urine Negative      RBC Urine <1      WBC Urine 0     HEPATIC FUNCTION PANEL - Normal    Protein Total 7.4      Albumin 4.0      Bilirubin Total 0.5      Alkaline Phosphatase 111      AST 20      ALT 17      Bilirubin Direct <0.20     TROPONIN T, HIGH SENSITIVITY - Normal    Troponin T, High Sensitivity 22     GLUCOSE MONITOR NURSING POCT   CBC WITH PLATELETS AND DIFFERENTIAL    WBC Count 7.7      RBC Count 5.61      Hemoglobin 15.2      Hematocrit 47.4      MCV 85      MCH 27.1      MCHC 32.1      RDW 13.8      Platelet Count 348      % Neutrophils 55      % Lymphocytes 34      % Monocytes 8      % Eosinophils 2      % Basophils 1      % Immature Granulocytes 0      NRBCs per 100 WBC 0      Absolute Neutrophils 4.3      Absolute Lymphocytes 2.6      Absolute Monocytes 0.6      Absolute Eosinophils 0.1      Absolute Basophils 0.1      Absolute Immature Granulocytes 0.0      Absolute NRBCs 0.0         RADIOLOGY:  Chest XR,  PA & LAT   Final Result   IMPRESSION: Negative chest.      Head CT w/o contrast   Final Result   IMPRESSION:   1.  No hyperdense hemorrhage.      2.  No convincing evidence for focal acute infarction with moderate chronic small vessel ischemic/degenerative changes of aging presumed in the periventricular deep white matter of both cerebral hemispheres. Age-appropriate generalized cerebral and    cerebellar parenchymal volume loss.      3.  Please see above for additional details and description including polypoid membrane thickening or dependent air-fluid levels in the sphenoid sinus, which may indicate inflammatory sinusitis at this level.          ECG:  EKG was collected and  independently interpreted by myself and also confirmed by MD.    Findings: Normal sinus rhythm with a rate of 63.  No interval prolongation.  No ischemia or STEMI.    Comparisons: Compared to ECG from June 2021, no significant change.      PROCEDURES     None             History:  Supplemental history from: Family Member/Significant Other  External Record(s) reviewed: Outpatient Record: See HPI    Work Up:  Chart documentation includes differentials considered and any EKGs or imaging independently interpreted by provider, where specified.  In additional to work up documented, I considered the following work up: Documented in chart, if applicable.    External consultation:  Discussion of management with another provider: Hospitalist    Complicating factors:  Care impacted by chronic illness: Chronic Kidney Disease, Diabetes, and Hypertension  Care affected by social determinants of health: N/A    Disposition considerations: Admit.    FINAL IMPRESSION:    ICD-10-CM    1. Confusion  R41.0       2. Hypertensive urgency  I16.0             MEDICATIONS GIVEN IN THE EMERGENCY DEPARTMENT:  Medications   sodium chloride 0.9% BOLUS 1,000 mL (1,000 mLs Intravenous $New Bag 3/5/24 1452)   hydrALAZINE (APRESOLINE) injection 10 mg (10 mg Intravenous $Given 3/5/24 1452)         NEW PRESCRIPTIONS STARTED AT TODAY'S ED VISIT:  New Prescriptions    No medications on file              Some or all of this documentation has been completed using dictation software and mild grammatical errors may be present. Please contact me with any concerns regarding this.       Viridiana Salomon PA-C  Emergency Medicine   [facility] EMERGENCY DEPARTMENT       Viridiana Salomon PA-C  03/05/24 7213

## 2024-03-05 NOTE — H&P
"[facility]    History and Physical - Hospitalist Service       Date of Admission:  3/5/2024    Assessment & Plan   Shant Muñoz is a 78 year old male with history of hyperlipidemia, hypertension, depression, and orthostatic hypotension admitted due to hypertensive emergency and suspected hypertensive encephalopathy.      Hypertensive emergency  Suspected hypertensive encephalopathy  Possibly triggered by medication noncompliance versus recently decreased dosage of antihypertensive.  Mentation has improved to baseline. There was no focal neurological deficit.  Head CT showed no acute intracranial process.    IV hydralazine as needed to keep SBP around 200 mmHg for now  Consider resuming PTA lisinopril tomorrow  Avoid rapidly lowering blood pressure  Continue telemetry  Follow-up echocardiogram      Oral dryness  Continue PTA pilocarpine    Hyperlipidemia  Continue PTA pravastatin 80 mg at bedtime    Depression  Continue PTA venlafaxine and trazodone.          Diet: Combination Diet Low Saturated Fat Na <2400mg Diet, No Caffeine Diet  DVT Prophylaxis: Pneumatic Compression Devices  Parker Catheter: Not present  Lines: None     Cardiac Monitoring: ACTIVE order. Indication: Hypertensive emergency, hypertensive encephalopathy  Code Status: Full Code    Clinically Significant Risk Factors Present on Admission                  # Hypertension: Noted on problem list      # Overweight: Estimated body mass index is 28.7 kg/m  as calculated from the following:    Height as of this encounter: 1.778 m (5' 10\").    Weight as of this encounter: 90.7 kg (200 lb).              Disposition Plan      Expected Discharge Date: 03/07/2024                  Eleonora Mir MD  Hospitalist Service  [facility]  Securely message with Idea Device (more info)  Text page via [facility] Paging/Directory     ______________________________________________________________________    Chief Complaint "   Confusion    History is obtained from the patient and son, Lang    History of Present Illness   Shant Muñoz is a 78 year old male with history of type 2 diabetes, hyperlipidemia, hypertension, depression, and orthostatic hypotension who presents to the ER with confusion.    He was in his usual state of health until this afternoon when he was noticed to be confused/disoriented when at the dentist office.  He was later advised by the dentist to go to the ER due to confusion. Upon arrival in ER, blood pressure was markedly elevated at 263/123.  There was no focal neurological deficit.  The patient only demonstrated orientation to self and was unable to recall how he arrived at the dentist's office earlier in the day.  He reported experiencing chronic dryness in the mouth and typically resorts to chewing gum to alleviate this discomfort.    As per the report, the dosage of lisinopril was recently reduced due to complaints of dizziness. The patient resides alone and typically manages activities of daily living independently, although there is a history of mild cognitive impairment. The patient was last noted to be well two days ago.     Initial blood pressure was 245/112, pulse 60, respiratory 16, temperature 96.8  F.  Notable laboratory findings include creatinine 1.33 compared to 1.19 on 6/6/2022, normal CBC, normal urinalysis.  Chest radiograph was unremarkable.  Head CT showed polypoid membrane thickening or dependent air-fluid levels in the sphenoid sinus, which may indicate inflammatory sinusitis and no evidence of acute intracranial process.      He received IV hydralazine and normal saline 1 L bolus in the ER.  Systolic blood pressure improved to 220s.  Mentation improved significantly afterwards as patient became alert and oriented to place, person and time. Patient's son is at bedside.  He wants to be full code.        Past Medical History    Past Medical History:   Diagnosis Date    Diabetes (H)      Hypertension     Syncope, unspecified syncope type        Past Surgical History   Past Surgical History:   Procedure Laterality Date    ANTERIOR FUSION CERVICAL SPINE  07/26/2021    C5-6 plate removal and C6-7 decompression and fusion    FUSION CERVICAL ANTERIOR ONE LEVEL N/A 7/26/2021    Procedure: CERVICAL 6-CERVICAL 7 ANTERIOR CERVICAL DECOMPRESSION AND FUSION;  Surgeon: Nataly Layton MD;  Location: [facility] OR    IR CERVICAL EPIDURAL STEROID INJECTION  1/19/2012    IR CERVICAL EPIDURAL STEROID INJECTION  6/25/2021    IR LYMPH NODE BIOPSY  12/21/2022    REMOVE HARDWARE CERVICAL ANTERIOR SPINE N/A 7/26/2021    Procedure: REMOVAL OF PRIOR CERVICAL 5-CERVICAL 6 CERVICAL PLATE;  Surgeon: Nataly Layton MD;  Location: [facility] [place] [place]       Prior to Admission Medications   Prior to Admission Medications   Prescriptions Last Dose Informant Patient Reported? Taking?   Vitamin D3 (CHOLECALCIFEROL) 125 MCG (5000 UT) tablet 3/5/2024 at am  Yes Yes   Sig: Take 1 tablet by mouth daily   artificial saliva (BIOTENE MT) AERS spray   Yes Yes   Sig: Take 2 sprays by mouth every 6 hours as needed for dry mouth   gabapentin (NEURONTIN) 300 MG capsule 3/5/2024 at am  Yes Yes   Sig: Take 300 mg by mouth 2 times daily   lisinopril (ZESTRIL) 10 MG tablet 3/5/2024 at am  Yes Yes   Sig: Take 10 mg by mouth daily   pilocarpine (SALAGEN) 5 MG tablet 3/5/2024 at am  Yes Yes   Sig: Take 5 mg by mouth 3 times daily   pravastatin (PRAVACHOL) 80 MG tablet 3/4/2024 at hs  Yes Yes   Sig: Take 80 mg by mouth At Bedtime    traZODone (DESYREL) 50 MG tablet 3/4/2024 at hs  Yes Yes   Sig: Take 50 mg by mouth at bedtime   venlafaxine (EFFEXOR XR) 75 MG 24 hr capsule 3/5/2024 at am  Yes Yes   Sig: Take 75 mg by mouth daily      Facility-Administered Medications: None        Review of Systems    The 10 point Review of Systems is negative other than noted in the HPI or here.     Physical Exam   Vital Signs: Temp: 96.8  F (36  C) Temp  src: Temporal BP: (!) 160/64 Pulse: 76   Resp: 29 SpO2: 97 % O2 Device: None (Room air)    Weight: 200 lbs 0 oz    General appearance: Awake, Alert, Cooperative, not in any obvious distress and appears stated age   HEENT: Normocephalic, atraumatic, conjunctiva clear without icterus and ears without discharge  Lungs: Clear to auscultation bilaterally, no wheezing, good air exchange, normal work of breathing  Cardiovascular: Regular Rate and Rythm, normal apical impulse, normal S1 and S2, no lower extremity edema bilaterally  Abdomen: Soft, non-tender and Non-distended, active bowel sounds  Skin: Skin color, texture normal and bruising or bleeding. No rashes or lesions over face, neck, arms and legs, turgor normal.  Musculoskeletal: No bony deformities or joint tenderness. Normal ROM upon flexion & extension.   Neurologic: Alert & Oriented X 3, Facial symmetry preserved and upper & lower extremities moving well with symmetry  Psychiatric: Calm, normal eye contact and normal affect      Medical Decision Making       60 MINUTES SPENT BY ME on the date of service doing chart review, history, exam, documentation & further activities per the note.      Data     I have personally reviewed the following data over the past 24 hrs:    7.7  \   15.2   / 348     141 103 12.6 /  116 (H)   4.2 33 (H) 1.33 (H) \     ALT: 17 AST: 20 AP: 111 TBILI: 0.5   ALB: 4.0 TOT PROTEIN: 7.4 LIPASE: N/A     Trop: 22 BNP: N/A       Imaging results reviewed over the past 24 hrs:   Recent Results (from the past 24 hour(s))   Head CT w/o contrast    Narrative    EXAM: CT HEAD W/O CONTRAST  LOCATION: [facility]  DATE: 03/05/2024    INDICATION: Confusion that started sometime in the last 48 hours.  COMPARISON: None  TECHNIQUE: Routine CT head without IV contrast. Multiplanar reformats. Dose reduction techniques were used.    FINDINGS:  INTRACRANIAL CONTENTS: No hyperdense hemorrhage. No extra-axial blood products or fluid  collections are noted. Corpus callosum is intact. Cerebellar tonsillar position is normal. Sella shows no acute abnormality. Patchy and confluent low-attenuation   changes without mass effect noted in the periventricular deep white matter of both cerebral hemispheres, most compatible with moderate chronic small vessel ischemic/degenerative changes of aging. No convincing evidence for acute infarction focally is   identified. Mild to moderate generalized volume loss. No hydrocephalus. No sella/supersellar mass or hemorrhage. Vascular calcification.     VISUALIZED ORBITS/SINUSES/MASTOIDS: Prior bilateral cataract surgery. Visualized portions of the orbits are otherwise unremarkable. Air-fluid levels or polypoid membrane thickening dependently in the sphenoid sinus may indicate inflammatory sinusitis.   Paranasal sinuses are otherwise clear.    BONES/SOFT TISSUES: No fracture of the calvarium or skull base. Mineralization is satisfactory. EACs are clear. Nasopharynx is patent. No swelling of the facial or scalp soft tissues.      Impression    IMPRESSION:  1.  No hyperdense hemorrhage.    2.  No convincing evidence for focal acute infarction with moderate chronic small vessel ischemic/degenerative changes of aging presumed in the periventricular deep white matter of both cerebral hemispheres. Age-appropriate generalized cerebral and   cerebellar parenchymal volume loss.    3.  Please see above for additional details and description including polypoid membrane thickening or dependent air-fluid levels in the sphenoid sinus, which may indicate inflammatory sinusitis at this level.   Chest XR,  PA & LAT    Narrative    EXAM: XR CHEST 2 VIEWS  LOCATION: [facility]  DATE: 3/5/2024    INDICATION: Acute confusion  COMPARISON: None.      Impression    IMPRESSION: Negative chest.

## 2024-03-06 ENCOUNTER — APPOINTMENT (OUTPATIENT)
Dept: CARDIOLOGY | Facility: HOSPITAL | Age: 79
End: 2024-03-06
Attending: INTERNAL MEDICINE
Payer: COMMERCIAL

## 2024-03-06 ENCOUNTER — APPOINTMENT (OUTPATIENT)
Dept: PHYSICAL THERAPY | Facility: HOSPITAL | Age: 79
End: 2024-03-06
Attending: INTERNAL MEDICINE
Payer: COMMERCIAL

## 2024-03-06 VITALS
WEIGHT: 200 LBS | HEIGHT: 70 IN | OXYGEN SATURATION: 97 % | BODY MASS INDEX: 28.63 KG/M2 | DIASTOLIC BLOOD PRESSURE: 71 MMHG | TEMPERATURE: 97.8 F | HEART RATE: 70 BPM | SYSTOLIC BLOOD PRESSURE: 142 MMHG | RESPIRATION RATE: 18 BRPM

## 2024-03-06 LAB — LVEF ECHO: NORMAL

## 2024-03-06 PROCEDURE — 250N000013 HC RX MED GY IP 250 OP 250 PS 637: Performed by: INTERNAL MEDICINE

## 2024-03-06 PROCEDURE — 97162 PT EVAL MOD COMPLEX 30 MIN: CPT | Mod: GP

## 2024-03-06 PROCEDURE — 250N000013 HC RX MED GY IP 250 OP 250 PS 637

## 2024-03-06 PROCEDURE — 97110 THERAPEUTIC EXERCISES: CPT | Mod: GP

## 2024-03-06 PROCEDURE — G0378 HOSPITAL OBSERVATION PER HR: HCPCS

## 2024-03-06 PROCEDURE — 250N000011 HC RX IP 250 OP 636: Performed by: INTERNAL MEDICINE

## 2024-03-06 PROCEDURE — 99239 HOSP IP/OBS DSCHRG MGMT >30: CPT | Performed by: INTERNAL MEDICINE

## 2024-03-06 PROCEDURE — 93306 TTE W/DOPPLER COMPLETE: CPT | Mod: 26 | Performed by: INTERNAL MEDICINE

## 2024-03-06 PROCEDURE — 96376 TX/PRO/DX INJ SAME DRUG ADON: CPT | Mod: 59

## 2024-03-06 PROCEDURE — 97530 THERAPEUTIC ACTIVITIES: CPT | Mod: GP

## 2024-03-06 PROCEDURE — 93306 TTE W/DOPPLER COMPLETE: CPT

## 2024-03-06 RX ORDER — ACETAMINOPHEN 650 MG/1
650 SUPPOSITORY RECTAL EVERY 4 HOURS PRN
Status: DISCONTINUED | OUTPATIENT
Start: 2024-03-06 | End: 2024-03-06 | Stop reason: HOSPADM

## 2024-03-06 RX ORDER — ACETAMINOPHEN 325 MG/1
650 TABLET ORAL EVERY 4 HOURS PRN
Status: DISCONTINUED | OUTPATIENT
Start: 2024-03-06 | End: 2024-03-06 | Stop reason: HOSPADM

## 2024-03-06 RX ADMIN — VENLAFAXINE HYDROCHLORIDE 75 MG: 75 CAPSULE, EXTENDED RELEASE ORAL at 08:14

## 2024-03-06 RX ADMIN — GABAPENTIN 300 MG: 300 CAPSULE ORAL at 08:15

## 2024-03-06 RX ADMIN — HYDRALAZINE HYDROCHLORIDE 10 MG: 20 INJECTION INTRAMUSCULAR; INTRAVENOUS at 08:15

## 2024-03-06 RX ADMIN — Medication 2 SPRAY: at 08:17

## 2024-03-06 RX ADMIN — LISINOPRIL 10 MG: 5 TABLET ORAL at 08:20

## 2024-03-06 RX ADMIN — ACETAMINOPHEN 650 MG: 325 TABLET ORAL at 08:14

## 2024-03-06 RX ADMIN — PILOCARPINE HYDROCHLORIDE 5 MG: 5 TABLET, FILM COATED ORAL at 08:14

## 2024-03-06 RX ADMIN — PILOCARPINE HYDROCHLORIDE 5 MG: 5 TABLET, FILM COATED ORAL at 13:37

## 2024-03-06 RX ADMIN — Medication 2 SPRAY: at 01:35

## 2024-03-06 RX ADMIN — ACETAMINOPHEN 650 MG: 325 TABLET ORAL at 02:07

## 2024-03-06 ASSESSMENT — ACTIVITIES OF DAILY LIVING (ADL)
ADLS_ACUITY_SCORE: 38

## 2024-03-06 NOTE — PROGRESS NOTES
03/06/24 1030   Appointment Info   Signing Clinician's Name / Credentials (PT) Lisette Batista,PT   Living Environment   People in Home alone   Current Living Arrangements condominium   Home Accessibility stairs to enter home   Number of Stairs, Main Entrance greater than 10 stairs   Stair Railings, Main Entrance railings on both sides of stairs   Self-Care   Usual Activity Tolerance good   Equipment Currently Used at Home none   Fall history within last six months yes   Number of times patient has fallen within last six months 1   Activity/Exercise/Self-Care Comment PT I ADLS/ ADLs, driving   General Information   Onset of Illness/Injury or Date of Surgery 03/05/24   Referring Physician Daniella Alejandro   Patient/Family Therapy Goals Statement (PT) return home   Pertinent History of Current Problem (include personal factors and/or comorbidities that impact the POC) Shant Muñoz is a 78 year old male with history of type 2 diabetes, hyperlipidemia, hypertension, depression, and orthostatic hypotension who presents to the ER with confusion.     He was in his usual state of health until this afternoon when he was noticed to be confused/disoriented when at the dentist office.  He was later advised by the dentist to go to the ER due to confusion. Upon arrival in ER, blood pressure was markedly elevated at 263/123.   General Observations pt in bed agreeable to PT   Cognition   Affect/Mental Status (Cognition) WFL   Orientation Status (Cognition) oriented x 4   Pain Assessment   Patient Currently in Pain No   Range of Motion (ROM)   ROM Comment BLE WFL   Strength (Manual Muscle Testing)   Strength Comments BLE WFL   Bed Mobility   Comment, (Bed Mobility) I with supine > sit reports slight ligthehadedness with sitting up BP in sitting 126/61   Transfers   Comment, (Transfers) pt SBA/ I with sit<>stand no Ad, increased dizziness with standing , BP in standng 95/55- RN notified -pt retured to sitting    Gait/Stairs (Locomotion)   Comment, (Gait/Stairs) unable to amb due to drop in BP with symptoms   Balance   Balance Comments no LOB   Clinical Impression   Criteria for Skilled Therapeutic Intervention Yes, treatment indicated   PT Diagnosis (PT) decreased functional mobility   Influenced by the following impairments lightheaded   Functional limitations due to impairments transfers, gait and stairs   Clinical Presentation (PT Evaluation Complexity) evolving   Clinical Presentation Rationale presents as medically diagnosed   Clinical Decision Making (Complexity) moderate complexity   Planned Therapy Interventions (PT) gait training;transfer training;stair training   Risk & Benefits of therapy have been explained evaluation/treatment results reviewed   PT Total Evaluation Time   PT Eval, Moderate Complexity Minutes (38855) 14   Physical Therapy Goals   PT Frequency Daily   PT Predicted Duration/Target Date for Goal Attainment 03/13/24   PT Goals Transfers;Gait;Stairs   PT: Transfers Independent;Sit to/from stand   PT: Gait Independent;Greater than 200 feet   PT: Stairs Greater than 10 stairs;Rail on both sides;Modified independent   Interventions   Interventions Quick Adds Therapeutic Activity;Therapeutic Procedure   Therapeutic Procedure/Exercise   Ther. Procedure: strength, endurance, ROM, flexibillity Minutes (24296) 10   Symptoms Noted During/After Treatment dizziness   Treatment Detail/Skilled Intervention seated BLE ex x10-15 reps   Therapeutic Activity   Therapeutic Activities: dynamic activities to improve functional performance Minutes (65486) 10   Symptoms Noted During/After Treatment Dizziness   Treatment Detail/Skilled Intervention additional supine<>sit and sit<>stand , pt reporting increased lightheadedness with standing BP 97/55, pt return to bed and RN notified   PT Discharge Planning   PT Plan moniotr BP, transfir and gait no AD, stairs when able   PT Discharge Recommendation (DC Rec)   (pt limited  by lightheadedness, if symptoms improve with be able to return home, if not may need A at home /TCU)   PT Rationale for DC Rec pt limited by lightheadedness, SBA/I with bed mob and sit<>stand   PT Brief overview of current status pt limited by lightheadedness, SBA/I with bed mob and sit<>stand   PT Equipment Needed at Discharge other (see comments)  (none)

## 2024-03-06 NOTE — PLAN OF CARE
Physical Therapy Discharge Summary    Reason for therapy discharge:    Discharged to home.    Progress towards therapy goal(s). See goals on Care Plan in [facility] electronic health record for goal details.  Goals not met.  Barriers to achieving goals:   limited tolerance for therapy and discharge from facility.    Therapy recommendation(s):    No further therapy is recommended.

## 2024-03-06 NOTE — PROGRESS NOTES
"[facility] ED Handoff Report    ED Chief Complaint: Hypertensive urgency    ED Diagnosis:  (R41.0) Confusion  Comment: confusion resolved. Oriented x4  Plan: Echo in the morning. Cardiac monitoring : Normal sinus rhythm.     (I16.0) Hypertensive urgency  Comment: BP improving. Has prn hydralazine oral and IV with parameters.   Plan: Monitor.        PMH:    Past Medical History:   Diagnosis Date    Diabetes (H)     Hypertension     Syncope, unspecified syncope type         Code Status:  Full Code     Falls Risk: Yes Band: Applied    Current Living Situation/Residence: lives alone     Elimination Status: Continent: Yes     Activity Level: SBA    Patients Preferred Language:  English     Needed: No    Vital Signs:  BP (!) 150/71 (BP Location: Left arm, Patient Position: Right side, Cuff Size: Adult Regular)   Pulse 60   Temp 98.1  F (36.7  C) (Oral)   Resp 16   Ht 1.778 m (5' 10\")   Wt 90.7 kg (200 lb)   SpO2 96%   BMI 28.70 kg/m       Cardiac Rhythm: Normal sinus Rhythm    Pain Score: 5/10    Is the Patient Confused:  No    Last Food or Drink: 03/06/24 , took sips of water with meds.     Focused Assessment:  Pt is alert and oriented x4. Vitals WNL. tylenol given for headache. Uses biotene for dry mouth. Will have echo today.     Tests Performed: Done: Labs and Imaging    Treatments Provided:      Family Dynamics/Concerns: No    Family Updated On Visitor Policy: Yes    Plan of Care Communicated to Family: Yes        Belongings Checklist Done and Signed by Patient: Yes    Medications sent with patient: Biotene spray    Covid: asymptomatic , negative      RN: Radha Jung RN   3/6/2024 5:50 AM      "

## 2024-03-06 NOTE — PROGRESS NOTES
[facility]  OUTPATIENT PHYSICAL THERAPY EVALUATION  PLAN OF TREATMENT FOR OUTPATIENT REHABILITATION  (COMPLETE FOR INITIAL CLAIMS ONLY)  Patient's Last Name, First Name, M.I.  YOB: 1945  Shant Muñoz                        Provider's Name  [facility] Medical Record No.  [id number]                             Onset Date:  03/05/24   Start of Care Date:      Type:     _X_PT   ___OT   ___SLP Medical Diagnosis:                 PT Diagnosis:  decreased functional mobility Visits from SOC:  1     See note for plan of treatment, functional goals and certification details    I CERTIFY THE NEED FOR THESE SERVICES FURNISHED UNDER        THIS PLAN OF TREATMENT AND WHILE UNDER MY CARE     (Physician co-signature of this document indicates review and certification of the therapy plan).

## 2024-03-06 NOTE — PROGRESS NOTES
Pt alert, no confusion present, has slight headache with noted b/p> 200 systolic. Tylenol for head pain given. In bed or sitting on edge of bed so far. No dizziness present with current activity.

## 2024-03-06 NOTE — PROGRESS NOTES
Pt provided discharge and follow-up instructions. He had no additional questions. Pt discharged with belongings and son came to provide transportation.

## 2024-03-06 NOTE — PLAN OF CARE
Problem: Risk for Delirium  Goal: Improved Behavioral Control  Outcome: Progressing     Problem: Risk for Delirium  Goal: Improved Attention and Thought Clarity  Outcome: Progressing     Problem: Comorbidity Management  Goal: Blood Pressure in Desired Range  Outcome: Progressing   Goal Outcome Evaluation:    Pt slept between cares. Alert and oriented x4, able to make needs known. SBP in the 150s. Prn tylenol given x1 for headache rated 5/10. Prn biotene spray given for dry mouth. NSR on cardiac monitor.

## 2024-03-06 NOTE — DISCHARGE SUMMARY
"[facility]  Hospitalist Discharge Summary      Date of Admission:  3/5/2024  Date of Discharge:  3/6/2024  Discharging Provider: Daniella Vragas DO  Discharge Service: Hospitalist Service    Discharge Diagnoses   Hypertensive emergency  Encephalopathy related to hypertension    Clinically Significant Risk Factors     # Overweight: Estimated body mass index is 28.7 kg/m  as calculated from the following:    Height as of this encounter: 1.778 m (5' 10\").    Weight as of this encounter: 90.7 kg (200 lb).       Follow-ups Needed After Discharge   Follow-up Appointments     Follow-up and recommended labs and tests       Follow up with primary care provider, RIGOBERTO LR, within 2-3 days   regarding new diagnosis.      Recheck BP to assess need for medication adjustment.   ECHO normal            Unresulted Labs Ordered in the Past 30 Days of this Admission       No orders found for last 31 day(s).            Discharge Disposition   Discharged to home  Condition at discharge: Stable    Hospital Course   Patient presented with confusion from the dental office.  Found to have significantly elevated blood pressure at 263/123.  He did receive 3 doses of hydralazine over a 24-hour period.  Was resumed on his home lisinopril with good control blood pressure.  He was noted to have some lightheadedness while working with physical therapy in the morning.  This resolved by the afternoon.  ECHO was obtained and showed nothing concerning for valvular disease or wall motion abnormalities.  Patient can follow-up with his primary physician for further titration of blood pressure medications.  Prior to discharge patient had blood pressure of 132/63 and 142/71.    Consultations This Hospital Stay   PHYSICAL THERAPY ADULT IP CONSULT  OCCUPATIONAL THERAPY ADULT IP CONSULT    Code Status   Full Code    Time Spent on this Encounter   IDaniella DO, personally saw the patient today and spent " greater than 30 minutes discharging this patient.       Daniella Vargas DO  [facility] EMERGENCY DEPARTMENT  [address] [address]  Phone: [phone]  ______________________________________________________________________    Physical Exam   Vital Signs: Temp: 97.8  F (36.6  C) Temp src: Oral BP: (!) 142/71 Pulse: 70   Resp: 18 SpO2: 97 % O2 Device: None (Room air)    Weight: 200 lbs 0 oz         Primary Care Physician   RIGOBERTO LR    Discharge Orders      Reason for your hospital stay    Confusion related to elevated blood pressures.     Follow-up and recommended labs and tests     Follow up with primary care provider, RIGOBERTO LR, within 2-3 days regarding new diagnosis.      Recheck BP to assess need for medication adjustment.   ECHO normal     Activity    Your activity upon discharge: activity as tolerated     Diet    Follow this diet upon discharge: Regular       Significant Results and Procedures   Most Recent 3 CBC's:  Recent Labs   Lab Test 03/05/24  1338 06/06/22  1706 07/27/21  1017   WBC 7.7 7.7 12.7*   HGB 15.2 14.4 11.8*   MCV 85 83 81    344 360     Most Recent 3 BMP's:  Recent Labs   Lab Test 03/05/24  1338 03/05/24  1306 06/06/22  1706 07/27/21  1017     --  141 143   POTASSIUM 4.2  --  3.7 3.9   CHLORIDE 103  --  105 111*   CO2 33*  --  25 24   BUN 12.6  --  24 14   CR 1.33*  --  1.19 1.06   ANIONGAP 5*  --  11 8   REJI 9.9  --  9.1 9.0   * 104* 97 130*     Most Recent 2 LFT's:  Recent Labs   Lab Test 03/05/24  1338 06/24/21  1850   AST 20 24   ALT 17 32   ALKPHOS 111 95   BILITOTAL 0.5 0.6   ,   Results for orders placed or performed during the hospital encounter of 03/05/24   Head CT w/o contrast    Narrative    EXAM: CT HEAD W/O CONTRAST  LOCATION: [facility]  DATE: 03/05/2024    INDICATION: Confusion that started sometime in the last 48 hours.  COMPARISON: None  TECHNIQUE: Routine CT head  without IV contrast. Multiplanar reformats. Dose reduction techniques were used.    FINDINGS:  INTRACRANIAL CONTENTS: No hyperdense hemorrhage. No extra-axial blood products or fluid collections are noted. Corpus callosum is intact. Cerebellar tonsillar position is normal. Sella shows no acute abnormality. Patchy and confluent low-attenuation   changes without mass effect noted in the periventricular deep white matter of both cerebral hemispheres, most compatible with moderate chronic small vessel ischemic/degenerative changes of aging. No convincing evidence for acute infarction focally is   identified. Mild to moderate generalized volume loss. No hydrocephalus. No sella/supersellar mass or hemorrhage. Vascular calcification.     VISUALIZED ORBITS/SINUSES/MASTOIDS: Prior bilateral cataract surgery. Visualized portions of the orbits are otherwise unremarkable. Air-fluid levels or polypoid membrane thickening dependently in the sphenoid sinus may indicate inflammatory sinusitis.   Paranasal sinuses are otherwise clear.    BONES/SOFT TISSUES: No fracture of the calvarium or skull base. Mineralization is satisfactory. EACs are clear. Nasopharynx is patent. No swelling of the facial or scalp soft tissues.      Impression    IMPRESSION:  1.  No hyperdense hemorrhage.    2.  No convincing evidence for focal acute infarction with moderate chronic small vessel ischemic/degenerative changes of aging presumed in the periventricular deep white matter of both cerebral hemispheres. Age-appropriate generalized cerebral and   cerebellar parenchymal volume loss.    3.  Please see above for additional details and description including polypoid membrane thickening or dependent air-fluid levels in the sphenoid sinus, which may indicate inflammatory sinusitis at this level.   Chest XR,  PA & LAT    Narrative    EXAM: XR CHEST 2 VIEWS  LOCATION: [facility]  DATE: 3/5/2024    INDICATION: Acute  confusion  COMPARISON: None.      Impression    IMPRESSION: Negative chest.   Echocardiogram Complete     Value    LVEF  55-60%    Narrative    [id number]  [id number]  [id number]  [id number]^MATTEOMARIFER^[place] [address]     Name: NATALIO ARBOLEDA  MRN: [id number]  : 1945  Study Date: 2024 09:57 AM  Age: 78 yrs  Gender: Male  Patient [facility]  Reason For Study: Hypertensive Heart Disease  Ordering Physician: SAKINA PEREZ  Performed By: STEPHEN     BSA: 2.1 m2  Height: 70 in  Weight: 200 lb  HR: 71  BP: 132/63 mmHg  ______________________________________________________________________________  Procedure  Complete Portable Echo Adult. Hemodynamically significant valve disease is  identified. Recommend referral to valve clinic for further evaluation. Valve  clinic phone number: [phone]. [facility] Epic: [facility]  [facility].  ______________________________________________________________________________  Interpretation Summary     The left ventricle is normal in size.  The visual ejection fraction is 55-60%.  No regional wall motion abnormalities noted.  The right ventricle is not well visualized.  TAPSE is normal, which is consistent with normal right ventricular systolic  function.  Moderate aortic valve calcification is present.  Moderate valvular aortic stenosis.  IVC diameter and respiratory changes fall into an intermediate range  suggesting an RA pressure of 8 mmHg.  Sinus rhythm was noted.  The study was technically difficult. There is no comparison study available.  ______________________________________________________________________________  Left Ventricle  The left ventricle is normal in size. There is normal left ventricular wall  thickness. The visual ejection fraction is 55-60%. No regional wall motion  abnormalities noted.     Right Ventricle  The right ventricle is not well visualized. TAPSE is normal, which  is  consistent with normal right ventricular systolic function.     Atria  The left atrium is mildly dilated. Right atrial size is normal.     Mitral Valve  There is mild mitral annular calcification. There is mild (1+) mitral  regurgitation. There is no mitral valve stenosis.     Tricuspid Valve  The tricuspid valve is not well visualized. There is trace tricuspid  regurgitation.     Aortic Valve  Moderate aortic valve calcification is present. No aortic regurgitation is  present. Moderate valvular aortic stenosis.     Pulmonic Valve  The pulmonic valve is not well visualized. There is no pulmonic valvular  stenosis.     Vessels  IVC diameter and respiratory changes fall into an intermediate range  suggesting an RA pressure of 8 mmHg.     Pericardium  There is no pericardial effusion.     Rhythm  Sinus rhythm was noted.  ______________________________________________________________________________  MMode/2D Measurements & Calculations     IVSd: 0.94 cm  LVIDd: 5.0 cm  LVIDs: 3.4 cm  LVPWd: 1.1 cm  FS: 32.7 %  LV mass(C)d: 181.7 grams  LV mass(C)dI: 87.1 grams/m2  LA Volume (BP): 62.8 ml  LA Volume Index (BP): 30.0 ml/m2     LA Volume Indexed (AL/bp): 32.1 ml/m2  RV Base: 3.6 cm  RWT: 0.42  TAPSE: 2.0 cm     Doppler Measurements & Calculations  MV E max bautista: 68.7 cm/sec  MV A max bautista: 116.0 cm/sec  MV E/A: 0.59  MV dec slope: 336.0 cm/sec2  MV dec time: 0.20 sec  Ao V2 max: 325.0 cm/sec  Ao max P.0 mmHg  Ao V2 mean: 229.0 cm/sec  Ao mean P.0 mmHg  Ao V2 VTI: 74.8 cm  LV V1 max P.2 mmHg  LV V1 max: 143.0 cm/sec  LV V1 VTI: 25.4 cm  PA V2 max: 116.0 cm/sec  PA max P.4 mmHg  PA acc time: 0.09 sec  AV Bautista Ratio (DI): 0.44  E/E' av.1     Lateral E/e': 7.6  Medial E/e': 8.5  RV S Bautista: 12.5 cm/sec     ______________________________________________________________________________  Report approved by: Bucky Diaz 2024 12:24 PM             Discharge Medications   Current Discharge  Medication List        CONTINUE these medications which have NOT CHANGED    Details   artificial saliva (BIOTENE MT) AERS spray Take 2 sprays by mouth every 6 hours as needed for dry mouth      gabapentin (NEURONTIN) 300 MG capsule Take 300 mg by mouth 2 times daily      lisinopril (ZESTRIL) 10 MG tablet Take 10 mg by mouth daily      pilocarpine (SALAGEN) 5 MG tablet Take 5 mg by mouth 3 times daily      pravastatin (PRAVACHOL) 80 MG tablet Take 80 mg by mouth At Bedtime       traZODone (DESYREL) 50 MG tablet Take 50 mg by mouth at bedtime      venlafaxine (EFFEXOR XR) 75 MG 24 hr capsule Take 75 mg by mouth daily      Vitamin D3 (CHOLECALCIFEROL) 125 MCG (5000 UT) tablet Take 1 tablet by mouth daily           Allergies   No Known Allergies

## 2024-03-06 NOTE — PROGRESS NOTES
Discussed MOON. Pt is getting dressed and will call for his own ride once he is discharged. No further concerns and no needs at this time.

## 2024-03-09 ENCOUNTER — HEALTH MAINTENANCE LETTER (OUTPATIENT)
Age: 79
End: 2024-03-09

## 2024-03-10 ENCOUNTER — NURSE TRIAGE (OUTPATIENT)
Dept: NURSING | Facility: CLINIC | Age: 79
End: 2024-03-10
Payer: COMMERCIAL

## 2024-03-10 NOTE — TELEPHONE ENCOUNTER
Nurse Triage SBAR    Is this a 2nd Level Triage? NO    Situation: Son, Lang, calling about patient having elevated blood pressure.  Consent: obtained verbally from patient    Background: Patient was seen in the ED on 3/5/24 for confusion and hypertensive emergency. Patient states that he takes 10 mg Lisinopril and took it today. Son report systolic readings between 180-200 today. Unsure of diastolic reading. Had patient recheck BP while on the phone,    Assessment:   Blood pressure - 169/109 Pulse-67  No symptoms  No confusion, no chest pain, no breathing difficulty, no vision changes    Protocol Recommended Disposition:   See PCP Within 3 Days    Recommendation: Advised patient to continue to monitor blood pressure and follow up with primary care provider within 3 days. Gave parameters per protocol guidelines for when to be seen in ED or UC.  Patient verbalized understanding and agreed with plan.     Caroline Goodson RN [facility] 3/10/2024 1:40 PM    Reason for Disposition   Systolic BP  >= 160 OR Diastolic >= 100    Additional Information   Negative: SEVERE difficulty breathing (e.g., struggling for each breath, speaks in single words)   Negative: Difficult to awaken or acting confused (e.g., disoriented, slurred speech)   Negative: [1] Weakness of the face, arm or leg on one side of the body AND [2] new-onset   Negative: [1] Numbness (i.e., loss of sensation) of the face, arm or leg on one side of the body AND [2] new-onset   Negative: [1] Chest pain lasts > 5 minutes AND [2] history of heart disease (i.e., heart attack, bypass surgery, angina, angioplasty, CHF)   Negative: [1] Chest pain AND [2] took nitrogylcerin AND [3] pain was not relieved   Negative: Sounds like a life-threatening emergency to the triager   Negative: Symptom is main concern (e.g., headache, chest pain)   Negative: Low blood pressure is main concern   Negative: [1] Systolic BP  >= 160 OR Diastolic >= 100 AND [2] cardiac (e.g.,  breathing difficulty, chest pain) or neurologic symptoms (e.g., new-onset blurred or double vision, unsteady gait)   Negative: [1] Pregnant 20 or more weeks (or postpartum < 6 weeks) AND [2] new hand or face swelling   Negative: [1] Pregnant 20 or more weeks (or postpartum < 6 weeks) AND [2] Systolic BP >= 160 OR Diastolic >= 110   Negative: Ran out of BP medications   Negative: Systolic BP  >= 180 OR Diastolic >= 110   Negative: [1] Systolic BP  >= 180 OR Diastolic >= 110 AND [2] missed most recent dose of blood pressure medication   Negative: [1] Pregnant 20 or more weeks (or postpartum < 6 weeks) AND [2] Systolic BP  >= 140 OR Diastolic >= 90   Negative: [1] Systolic BP  >= 200 OR Diastolic >= 120 AND [2] having NO cardiac or neurologic symptoms    Protocols used: Blood Pressure - High-A-

## 2024-03-20 LAB
ATRIAL RATE - MUSE: 63 BPM
DIASTOLIC BLOOD PRESSURE - MUSE: NORMAL MMHG
INTERPRETATION ECG - MUSE: NORMAL
P AXIS - MUSE: 54 DEGREES
PR INTERVAL - MUSE: 142 MS
QRS DURATION - MUSE: 120 MS
QT - MUSE: 420 MS
QTC - MUSE: 429 MS
R AXIS - MUSE: 54 DEGREES
SYSTOLIC BLOOD PRESSURE - MUSE: NORMAL MMHG
T AXIS - MUSE: 66 DEGREES
VENTRICULAR RATE- MUSE: 63 BPM

## 2024-07-27 ENCOUNTER — HEALTH MAINTENANCE LETTER (OUTPATIENT)
Age: 79
End: 2024-07-27

## 2024-12-14 ENCOUNTER — HEALTH MAINTENANCE LETTER (OUTPATIENT)
Age: 79
End: 2024-12-14

## 2025-01-19 ENCOUNTER — HEALTH MAINTENANCE LETTER (OUTPATIENT)
Age: 80
End: 2025-01-19

## 2025-03-16 ENCOUNTER — HEALTH MAINTENANCE LETTER (OUTPATIENT)
Age: 80
End: 2025-03-16

## 2025-06-29 ENCOUNTER — HEALTH MAINTENANCE LETTER (OUTPATIENT)
Age: 80
End: 2025-06-29

## (undated) DEVICE — GLOVE BIOGEL PI INDICATOR 8.0 LF 41680

## (undated) DEVICE — PREP DURAPREP 06ML APL 8635

## (undated) DEVICE — Device

## (undated) DEVICE — TAPE ADH POROUS 3IN CURITY STD 7046C

## (undated) DEVICE — IOM STANDARD SUPPLY FEE

## (undated) DEVICE — SUTURE VICRYL+ 3-0 18 SH/CR UND VCP864

## (undated) DEVICE — TRAY PREP DRY SKIN SCRUB 067

## (undated) DEVICE — PLATE GROUNDING ADULT W/CORD 9165L

## (undated) DEVICE — DRSG KERLIX 4 1/2"X4YDS ROLL 6715

## (undated) DEVICE — IOM CASE FLAT FEE

## (undated) DEVICE — GLOVE BIOGEL PI ULTRATOUCH G SZ 8.5 42185

## (undated) DEVICE — SU MONOCRYL+ 4-0 18IN PS2 UND MCP496G

## (undated) DEVICE — ESU PENCIL SMOKE EVAC W/ROCKER SWITCH 0703-047-000

## (undated) DEVICE — PREP CHLORHEXIDINE 4% 0.5OZ (HIBICLENS) 57517

## (undated) DEVICE — ESU ELEC BLADE 2.75" COATED/INSULATED E1455

## (undated) DEVICE — DRSG PRIMAPORE 03 1/8X6" 66000318

## (undated) DEVICE — GLOVE BIOGEL PI ULTRATOUCH G SZ 6.5 42165

## (undated) DEVICE — SPONGE NEURO 1/2X1/2 WECK 200100

## (undated) DEVICE — TOOL DISSECT MIDAS MR8 14CM MATCH HEAD 3MM MR8-14MH30

## (undated) DEVICE — MARKER SURG SKIN STRL 77734

## (undated) DEVICE — ESU CORD BIPOLAR 12' E0512

## (undated) DEVICE — DRAPE C-ARM 60X42" 1013

## (undated) DEVICE — SOL NACL 0.9% IRRIG 1000ML BOTTLE 2F7124

## (undated) DEVICE — CATH TRAY FOLEY SURESTEP 16FR LF A947316

## (undated) DEVICE — ALCOHOL ISOPROPYL 4 OZ 70% IA7004

## (undated) DEVICE — DRAPE LAPAROTOMY PEDIATRIC 74X124

## (undated) DEVICE — SUCTION CANISTER MEDIVAC LINER 3000ML W/LID 65651-530

## (undated) DEVICE — ADH SKIN CLOSURE PREMIERPRO EXOFIN 1.0ML 3470

## (undated) DEVICE — TUBING SUCTION MEDI-VAC 1/4"X20' N620A - HE

## (undated) DEVICE — CUSTOM PACK LUMBAR FUSION SNE5BLFHEA

## (undated) DEVICE — GLOVE UNDER INDICATOR PI SZ 6.5 LF 41665

## (undated) DEVICE — SOL WATER IRRIG 1000ML BOTTLE 2F7114

## (undated) DEVICE — DRAPE C-ARMOR 5 SIDED 5523

## (undated) DEVICE — NEEDLE SPINAL DISP 22GA X 3.5" QUINCKE 333320

## (undated) DEVICE — GLOVE UNDER INDICATOR PI SZ 7.0 LF 41670

## (undated) DEVICE — GOWN LG DISP 9515

## (undated) DEVICE — DRILL BIT MEDT ELITE 11MM SS 7080510

## (undated) DEVICE — GOWN XXL 9575

## (undated) DEVICE — PIN DISTRACTION 14MM TI YELLOW DP-14-TY

## (undated) DEVICE — NEEDLE HYPO 18X1-1/2 SAFETY 305918